# Patient Record
Sex: FEMALE | Race: WHITE | NOT HISPANIC OR LATINO | Employment: OTHER | URBAN - METROPOLITAN AREA
[De-identification: names, ages, dates, MRNs, and addresses within clinical notes are randomized per-mention and may not be internally consistent; named-entity substitution may affect disease eponyms.]

---

## 2017-01-12 ENCOUNTER — OFFICE VISIT (OUTPATIENT)
Dept: INTERNAL MEDICINE | Facility: CLINIC | Age: 67
End: 2017-01-12

## 2017-01-12 VITALS
HEIGHT: 63 IN | SYSTOLIC BLOOD PRESSURE: 122 MMHG | TEMPERATURE: 98.7 F | DIASTOLIC BLOOD PRESSURE: 70 MMHG | HEART RATE: 66 BPM | BODY MASS INDEX: 24.43 KG/M2 | WEIGHT: 137.9 LBS | OXYGEN SATURATION: 98 %

## 2017-01-12 DIAGNOSIS — F51.01 PRIMARY INSOMNIA: Primary | ICD-10-CM

## 2017-01-12 PROCEDURE — 99213 OFFICE O/P EST LOW 20 MIN: CPT | Performed by: FAMILY MEDICINE

## 2017-01-12 RX ORDER — ZOLPIDEM TARTRATE 10 MG/1
TABLET ORAL
Qty: 90 TABLET | Refills: 1 | Status: SHIPPED | OUTPATIENT
Start: 2017-01-12 | End: 2017-01-30 | Stop reason: DRUGHIGH

## 2017-01-12 NOTE — PROGRESS NOTES
Subjective   Eve Khan is a 66 y.o. female.     Chief Complaint   Patient presents with   • following up on insomnia         History of Present Illness   Patient is doing well with Ambien her primary focus is achieving sleep once she is asleep she stays asleep Ambien is working well for her  The following portions of the patient's history were reviewed and updated as appropriate: allergies, current medications, past family history, past medical history, past social history, past surgical history and problem list.    Review of Systems   Constitutional: Negative.    Musculoskeletal: Positive for arthralgias.   Neurological: Negative.    Hematological: Negative.    Psychiatric/Behavioral: Negative.        Objective   Physical Exam   Constitutional: She is oriented to person, place, and time. She appears well-developed and well-nourished.   HENT:   Head: Normocephalic and atraumatic.   Eyes: Conjunctivae are normal.   Neurological: She is alert and oriented to person, place, and time.   Psychiatric: She has a normal mood and affect. Her behavior is normal. Judgment and thought content normal.   Nursing note and vitals reviewed.      Assessment/Plan   Eve was seen today for following up on insomnia.    Diagnoses and all orders for this visit:    Primary insomnia    Other orders  -     zolpidem (AMBIEN) 10 MG tablet; 1/2 to 1 at bedtime for sleep      written prescription given a patient  To send to her insurance company for #[id number]  Follow-up 3 months

## 2017-01-12 NOTE — MR AVS SNAPSHOT
Eve Khan   1/12/2017 12:30 PM   Office Visit    Dept Phone:  [phone]   Encounter #:  [id number]    Provider:  Yifan Douglas MD   Department:  [facility]                Your Full Care Plan              Today's Medication Changes          These changes are accurate as of: 1/12/17 11:59 PM.  If you have any questions, ask your nurse or doctor.               Medication(s)that have changed:     zolpidem 10 MG tablet   Commonly known as:  AMBIEN   1/2 to 1 at bedtime for sleep   What changed:    - how much to take  - how to take this  - when to take this  - reasons to take this  - additional instructions   Changed by:  Yifan Douglas MD            Where to Get Your Medications      You can get these medications from any pharmacy     Bring a paper prescription for each of these medications     zolpidem 10 MG tablet                  Your Updated Medication List          This list is accurate as of: 1/12/17 11:59 PM.  Always use your most recent med list.                cycloSPORINE 0.05 % ophthalmic emulsion   Commonly known as:  RESTASIS       estradiol 0.1 MG/GM vaginal cream   Commonly known as:  ESTRACE   Insert 2 g into the vagina Every Other Day. INSERT 2 gm APPLICATORFUL VAGINALLY every other day       Loratadine 10 MG capsule       spironolactone 50 MG tablet   Commonly known as:  ALDACTONE   Take 1 tablet by mouth daily.       zolpidem 10 MG tablet   Commonly known as:  AMBIEN   1/2 to 1 at bedtime for sleep               You Were Diagnosed With        Codes Comments    Primary insomnia    -  Primary ICD-10-CM: F51.01  ICD-9-CM: 307.42       Instructions     None    Patient Instructions History      Upcoming Appointments       MyChart Signup     [facility] allows you to send messages to your doctor, view your test results, renew your prescriptions, schedule appointments, and more. To sign up, go to  "[email or web address] and click on the Sign Up Now link in the New User? box. Enter your [employer] Activation Code exactly as it appears below along with the last four digits of your Social Security Number and your Date of Birth () to complete the sign-up process. If you do not sign up before the expiration date, you must request a new code.    [employer] Activation Code: [email or web address]  Expires: 2017  1:21 PM    If you have questions, you can email [email or web address] or call [phone] to talk to our [employer] staff. Remember, [employer] is NOT to be used for urgent needs. For medical emergencies, dial 911.               Other Info from Your Visit           Allergies     Amoxicillin  Hives      Reason for Visit     following up on insomnia           Vital Signs     Blood Pressure Pulse Temperature Height Weight    122/70 (BP Location: Left arm, Patient Position: Sitting, Cuff Size: Adult) 66 98.7 °F (37.1 °C) (Oral) 63\" (160 cm) 137 lb 14.4 oz (62.6 kg)    Last Menstrual Period Oxygen Saturation Breastfeeding? Body Mass Index Smoking Status    (Approximate) 98% No 24.43 kg/m2 Never Smoker      Problems and Diagnoses Noted     Difficulty falling or staying asleep        "

## 2017-01-20 ENCOUNTER — TELEPHONE (OUTPATIENT)
Dept: INTERNAL MEDICINE | Facility: CLINIC | Age: 67
End: 2017-01-20

## 2017-01-20 NOTE — TELEPHONE ENCOUNTER
[facility] called stating that the zolpidem is non-formulary and wanted to change this medication.  Spoke to patient - she is to call [employer] and her local pharmacy to see what her price would be before making her decision.  She will call back to let us know what she decides.

## 2017-01-23 ENCOUNTER — TELEPHONE (OUTPATIENT)
Dept: INTERNAL MEDICINE | Facility: CLINIC | Age: 67
End: 2017-01-23

## 2017-01-23 NOTE — TELEPHONE ENCOUNTER
Patient called stating a=that all she needs is a PA for her zolpidem.  Spoke to [facility] [phone] and PA for zolpidem denied - patient has not been on any other medication to help with her insomnia.  The covered medications are Rozerem, Trazodone, and Silenor.  Patient would like to try a covered medication.  Please advise.

## 2017-01-23 NOTE — TELEPHONE ENCOUNTER
[facility] called back asking if Dr. Douglas wanted to prescribe something else instead of the zolpidem.  We still have not hears from patient about her decision.  [facility] was notified that this prescription will stand as is until patient asks for it to be changed.  The zolpidem prescription will be denied at this point - patient will not be able to fill a non covered medication through [facility] and they will notify patient of this.

## 2017-01-24 RX ORDER — TRAZODONE HYDROCHLORIDE 50 MG/1
50 TABLET ORAL NIGHTLY
Qty: 90 TABLET | Refills: 0 | Status: SHIPPED | OUTPATIENT
Start: 2017-01-24 | End: 2017-01-30 | Stop reason: ALTCHOICE

## 2017-01-30 ENCOUNTER — TELEPHONE (OUTPATIENT)
Dept: INTERNAL MEDICINE | Facility: CLINIC | Age: 67
End: 2017-01-30

## 2017-01-30 RX ORDER — ZOLPIDEM TARTRATE 5 MG/1
5 TABLET ORAL NIGHTLY PRN
Qty: 30 TABLET | Refills: 0 | OUTPATIENT
Start: 2017-01-30 | End: 2017-05-30 | Stop reason: SDUPTHER

## 2017-01-30 NOTE — TELEPHONE ENCOUNTER
Patient called stating that she read information about Trazodone and she would rather not take this medication.  She would like to have a prescription for zolpidem called to [facility].  She will pay cash for this since it is not covered under her insurance.  Please advise.

## 2017-05-30 ENCOUNTER — OFFICE VISIT (OUTPATIENT)
Dept: INTERNAL MEDICINE | Facility: CLINIC | Age: 67
End: 2017-05-30

## 2017-05-30 VITALS
BODY MASS INDEX: 23.12 KG/M2 | TEMPERATURE: 98.6 F | WEIGHT: 130.5 LBS | OXYGEN SATURATION: 100 % | HEIGHT: 63 IN | SYSTOLIC BLOOD PRESSURE: 134 MMHG | HEART RATE: 69 BPM | DIASTOLIC BLOOD PRESSURE: 86 MMHG

## 2017-05-30 DIAGNOSIS — F51.01 PRIMARY INSOMNIA: Primary | ICD-10-CM

## 2017-05-30 DIAGNOSIS — M19.90 ARTHRITIS: ICD-10-CM

## 2017-05-30 DIAGNOSIS — Z00.00 MEDICARE ANNUAL WELLNESS VISIT, SUBSEQUENT: ICD-10-CM

## 2017-05-30 DIAGNOSIS — M85.80 OSTEOPENIA: ICD-10-CM

## 2017-05-30 DIAGNOSIS — R93.6 ABNORMAL FINDINGS ON DIAGNOSTIC IMAGING OF LIMBS: ICD-10-CM

## 2017-05-30 PROBLEM — H35.711 CENTRAL SEROUS CHORIORETINOPATHY OF RIGHT EYE: Status: ACTIVE | Noted: 2017-05-30

## 2017-05-30 PROCEDURE — G0009 ADMIN PNEUMOCOCCAL VACCINE: HCPCS | Performed by: FAMILY MEDICINE

## 2017-05-30 PROCEDURE — 90670 PCV13 VACCINE IM: CPT | Performed by: FAMILY MEDICINE

## 2017-05-30 PROCEDURE — G0439 PPPS, SUBSEQ VISIT: HCPCS | Performed by: FAMILY MEDICINE

## 2017-05-30 PROCEDURE — 99214 OFFICE O/P EST MOD 30 MIN: CPT | Performed by: FAMILY MEDICINE

## 2017-05-30 RX ORDER — BUDESONIDE AND FORMOTEROL FUMARATE DIHYDRATE 160; 4.5 UG/1; UG/1
2 AEROSOL RESPIRATORY (INHALATION) 2 TIMES DAILY PRN
COMMUNITY
End: 2021-08-02

## 2017-05-30 RX ORDER — SPIRONOLACTONE 50 MG/1
50 TABLET, FILM COATED ORAL DAILY
Qty: 90 TABLET | Refills: 0 | Status: SHIPPED | OUTPATIENT
Start: 2017-05-30 | End: 2017-09-30 | Stop reason: SDUPTHER

## 2017-05-30 RX ORDER — ZOLPIDEM TARTRATE 5 MG/1
5 TABLET ORAL NIGHTLY PRN
Qty: 30 TABLET | Refills: 1 | Status: SHIPPED | OUTPATIENT
Start: 2017-05-30 | End: 2018-06-29 | Stop reason: SDUPTHER

## 2017-06-02 ENCOUNTER — APPOINTMENT (OUTPATIENT)
Dept: LAB | Facility: HOSPITAL | Age: 67
End: 2017-06-02

## 2017-06-02 LAB
25(OH)D3 SERPL-MCNC: 38.5 NG/ML (ref 30–100)
CHOLEST SERPL-MCNC: 201 MG/DL (ref 0–200)
HCV AB SER DONR QL: NORMAL
HDLC SERPL-MCNC: 107 MG/DL (ref 40–60)
LDLC SERPL CALC-MCNC: 79 MG/DL (ref 0–100)
LDLC/HDLC SERPL: 0.74 {RATIO}
TRIGL SERPL-MCNC: 74 MG/DL (ref 0–150)
VLDLC SERPL-MCNC: 14.8 MG/DL (ref 5–40)

## 2017-06-02 PROCEDURE — 36415 COLL VENOUS BLD VENIPUNCTURE: CPT | Performed by: FAMILY MEDICINE

## 2017-06-02 PROCEDURE — 80061 LIPID PANEL: CPT | Performed by: FAMILY MEDICINE

## 2017-06-02 PROCEDURE — 82306 VITAMIN D 25 HYDROXY: CPT | Performed by: FAMILY MEDICINE

## 2017-06-02 PROCEDURE — 86803 HEPATITIS C AB TEST: CPT | Performed by: FAMILY MEDICINE

## 2017-06-07 NOTE — PROGRESS NOTES
Subjective   Eve Khan is a 66 y.o. female.     Chief Complaint   Patient presents with   • subsequent medicare wellness   • follow up to insomnia   • follow up to edema     Social History   Substance Use Topics   • Smoking status: Never Smoker   • Smokeless tobacco: Never Used   • Alcohol use Yes      Comment: MODERATELY       History of Present Illness   Follow up for insomnia and edema she's been using effectively for insomnia despite continue the same she also uses spironolactone edema which is very well-controlled she has no muscle aches or muscle spasms or cramps she is adequately hydrated and monitor kidney function as well as follow-up of vitamin D level due to her osteopenia and check lipid profile she has no acute concerns and is here for ongoing chronic disease management  The following portions of the patient's history were reviewed and updated as appropriate:PMHroutine: Social history , Past Medical History, Allergies, Current Medications, Active Problem List, Family History and Health Maintenance    Review of Systems   Constitutional: Negative for appetite change, fever and unexpected weight change.   HENT: Negative for ear pain, facial swelling and sore throat.    Eyes: Negative for pain and visual disturbance.   Respiratory: Negative for chest tightness, shortness of breath and wheezing.    Cardiovascular: Negative for chest pain and palpitations.   Gastrointestinal: Negative for abdominal pain and blood in stool.   Endocrine: Negative.    Genitourinary: Negative for difficulty urinating and hematuria.   Musculoskeletal: Negative for joint swelling.   Neurological: Negative for tremors, seizures and syncope.   Hematological: Negative for adenopathy.   Psychiatric/Behavioral: Negative.        Objective   Vitals:    05/30/17 1240   BP: 134/86   BP Location: Right arm   Patient Position: Sitting   Cuff Size: Adult   Pulse: 69   Temp: 98.6 °F (37 °C)   TempSrc: Oral   SpO2: 100%   Weight: 130 lb 8  "oz (59.2 kg)   Height: 63\" (160 cm)     Body mass index is 23.12 kg/(m^2).    Physical Exam   Constitutional: She is oriented to person, place, and time. She appears well-developed and well-nourished. No distress.   HENT:   Head: Normocephalic and atraumatic.   Right Ear: External ear normal.   Left Ear: External ear normal.   Eyes: Conjunctivae and EOM are normal. Pupils are equal, round, and reactive to light. Right eye exhibits no discharge. Left eye exhibits no discharge. No scleral icterus.   Neck: Normal range of motion. Neck supple. No tracheal deviation present. No thyromegaly present.   Cardiovascular: Normal rate, regular rhythm, normal heart sounds, intact distal pulses and normal pulses.  Exam reveals no gallop.    No murmur heard.  Pulmonary/Chest: Effort normal and breath sounds normal. No respiratory distress. She has no wheezes. She has no rales.   Abdominal: Soft. Bowel sounds are normal. She exhibits no distension. There is no tenderness.   Musculoskeletal: Normal range of motion.   Neurological: She is alert and oriented to person, place, and time. She exhibits normal muscle tone. Coordination normal.   Skin: Skin is warm. No rash noted. No erythema. No pallor.   Psychiatric: She has a normal mood and affect. Her behavior is normal. Judgment and thought content normal.   Nursing note and vitals reviewed.      Assessment/Plan   Problem List Items Addressed This Visit        Musculoskeletal and Integument    Arthritis    Osteopenia    Relevant Orders    Vitamin D 25 Hydroxy (Completed)       Other    Primary insomnia - Primary    Medicare annual wellness visit, subsequent    Relevant Orders    Hepatitis C Antibody (Completed)    DEXA Bone Density Axial    Lipid Panel (Completed)      Other Visit Diagnoses     Abnormal findings on diagnostic imaging of limbs         Relevant Orders    DEXA Bone Density Axial         follow-up results of her continue present management of osteopenia insomnia and edema " follow-up results

## 2017-06-12 ENCOUNTER — HOSPITAL ENCOUNTER (OUTPATIENT)
Dept: BONE DENSITY | Facility: HOSPITAL | Age: 67
Discharge: HOME | End: 2017-06-12
Admitting: FAMILY MEDICINE

## 2017-06-12 DIAGNOSIS — Z00.00 MEDICARE ANNUAL WELLNESS VISIT, SUBSEQUENT: ICD-10-CM

## 2017-06-12 DIAGNOSIS — R93.6 ABNORMAL FINDINGS ON DIAGNOSTIC IMAGING OF LIMBS: ICD-10-CM

## 2017-06-12 PROCEDURE — 77080 DXA BONE DENSITY AXIAL: CPT

## 2017-06-15 ENCOUNTER — TELEPHONE (OUTPATIENT)
Dept: INTERNAL MEDICINE | Facility: CLINIC | Age: 67
End: 2017-06-15

## 2017-06-15 NOTE — TELEPHONE ENCOUNTER
----- Message from Yifan Douglas MD sent at 6/15/2017  4:47 PM EDT -----  No change in bone density recommend calcium and vitamin D daily

## 2017-08-23 ENCOUNTER — TRANSCRIBE ORDERS (OUTPATIENT)
Dept: INTERNAL MEDICINE | Facility: CLINIC | Age: 67
End: 2017-08-23

## 2017-08-23 DIAGNOSIS — Z12.31 ENCOUNTER FOR MAMMOGRAM TO ESTABLISH BASELINE MAMMOGRAM: Primary | ICD-10-CM

## 2017-09-14 ENCOUNTER — HOSPITAL ENCOUNTER (OUTPATIENT)
Dept: MAMMOGRAPHY | Facility: HOSPITAL | Age: 67
Discharge: HOME | End: 2017-09-14
Admitting: FAMILY MEDICINE

## 2017-09-14 DIAGNOSIS — Z12.31 ENCOUNTER FOR MAMMOGRAM TO ESTABLISH BASELINE MAMMOGRAM: ICD-10-CM

## 2017-09-14 PROCEDURE — G0202 SCR MAMMO BI INCL CAD: HCPCS

## 2017-09-18 ENCOUNTER — TELEPHONE (OUTPATIENT)
Dept: INTERNAL MEDICINE | Facility: CLINIC | Age: 67
End: 2017-09-18

## 2017-09-18 NOTE — TELEPHONE ENCOUNTER
----- Message from Yifan Douglas MD sent at 9/18/2017  8:42 AM EDT -----  No evidence of malignancy in either breast

## 2017-10-02 RX ORDER — SPIRONOLACTONE 50 MG/1
TABLET, FILM COATED ORAL
Qty: 90 TABLET | Refills: 0 | Status: SHIPPED | OUTPATIENT
Start: 2017-10-02 | End: 2018-06-29 | Stop reason: SDUPTHER

## 2018-06-29 ENCOUNTER — OFFICE VISIT (OUTPATIENT)
Dept: INTERNAL MEDICINE | Facility: CLINIC | Age: 68
End: 2018-06-29

## 2018-06-29 VITALS
TEMPERATURE: 98.6 F | HEART RATE: 71 BPM | OXYGEN SATURATION: 98 % | SYSTOLIC BLOOD PRESSURE: 132 MMHG | WEIGHT: 132.3 LBS | BODY MASS INDEX: 23.44 KG/M2 | HEIGHT: 63 IN | DIASTOLIC BLOOD PRESSURE: 76 MMHG

## 2018-06-29 DIAGNOSIS — F51.01 PRIMARY INSOMNIA: ICD-10-CM

## 2018-06-29 DIAGNOSIS — H81.13 BENIGN PAROXYSMAL POSITIONAL VERTIGO DUE TO BILATERAL VESTIBULAR DISORDER: ICD-10-CM

## 2018-06-29 DIAGNOSIS — R60.0 LOCALIZED EDEMA: ICD-10-CM

## 2018-06-29 DIAGNOSIS — Z23 NEED FOR VACCINATION FOR PNEUMOCOCCUS: ICD-10-CM

## 2018-06-29 DIAGNOSIS — Z00.00 MEDICARE ANNUAL WELLNESS VISIT, SUBSEQUENT: Primary | ICD-10-CM

## 2018-06-29 PROCEDURE — G0009 ADMIN PNEUMOCOCCAL VACCINE: HCPCS | Performed by: FAMILY MEDICINE

## 2018-06-29 PROCEDURE — 99214 OFFICE O/P EST MOD 30 MIN: CPT | Performed by: FAMILY MEDICINE

## 2018-06-29 PROCEDURE — G0439 PPPS, SUBSEQ VISIT: HCPCS | Performed by: FAMILY MEDICINE

## 2018-06-29 PROCEDURE — 90732 PPSV23 VACC 2 YRS+ SUBQ/IM: CPT | Performed by: FAMILY MEDICINE

## 2018-06-29 RX ORDER — SPIRONOLACTONE 50 MG/1
50 TABLET, FILM COATED ORAL DAILY
Qty: 90 TABLET | Refills: 1 | Status: SHIPPED | OUTPATIENT
Start: 2018-06-29 | End: 2018-07-26 | Stop reason: SDUPTHER

## 2018-06-29 RX ORDER — ZOLPIDEM TARTRATE 5 MG/1
5 TABLET ORAL NIGHTLY PRN
Qty: 30 TABLET | Refills: 1 | Status: SHIPPED | OUTPATIENT
Start: 2018-06-29 | End: 2018-07-11 | Stop reason: SDUPTHER

## 2018-06-30 LAB
ALBUMIN SERPL-MCNC: 3.9 G/DL (ref 3.6–4.8)
ALBUMIN/GLOB SERPL: 1.6 {RATIO} (ref 1.2–2.2)
ALP SERPL-CCNC: 39 IU/L (ref 39–117)
ALT SERPL-CCNC: 22 IU/L (ref 0–32)
AST SERPL-CCNC: 23 IU/L (ref 0–40)
BASOPHILS # BLD AUTO: 0.1 X10E3/UL (ref 0–0.2)
BASOPHILS NFR BLD AUTO: 1 %
BILIRUB SERPL-MCNC: 0.4 MG/DL (ref 0–1.2)
BUN SERPL-MCNC: 19 MG/DL (ref 8–27)
BUN/CREAT SERPL: 20 (ref 12–28)
CALCIUM SERPL-MCNC: 9.2 MG/DL (ref 8.7–10.3)
CHLORIDE SERPL-SCNC: 99 MMOL/L (ref 96–106)
CHOLEST SERPL-MCNC: 188 MG/DL (ref 100–199)
CO2 SERPL-SCNC: 26 MMOL/L (ref 20–29)
CREAT SERPL-MCNC: 0.94 MG/DL (ref 0.57–1)
EOSINOPHIL # BLD AUTO: 0.1 X10E3/UL (ref 0–0.4)
EOSINOPHIL NFR BLD AUTO: 4 %
ERYTHROCYTE [DISTWIDTH] IN BLOOD BY AUTOMATED COUNT: 13.4 % (ref 12.3–15.4)
GLOBULIN SER CALC-MCNC: 2.5 G/DL (ref 1.5–4.5)
GLUCOSE SERPL-MCNC: 82 MG/DL (ref 65–99)
HCT VFR BLD AUTO: 39.4 % (ref 34–46.6)
HDLC SERPL-MCNC: 91 MG/DL
HGB BLD-MCNC: 13.3 G/DL (ref 11.1–15.9)
IMM GRANULOCYTES # BLD: 0 X10E3/UL (ref 0–0.1)
IMM GRANULOCYTES NFR BLD: 0 %
LDLC SERPL CALC-MCNC: 75 MG/DL (ref 0–99)
LYMPHOCYTES # BLD AUTO: 1.2 X10E3/UL (ref 0.7–3.1)
LYMPHOCYTES NFR BLD AUTO: 33 %
MCH RBC QN AUTO: 29.1 PG (ref 26.6–33)
MCHC RBC AUTO-ENTMCNC: 33.8 G/DL (ref 31.5–35.7)
MCV RBC AUTO: 86 FL (ref 79–97)
MONOCYTES # BLD AUTO: 0.4 X10E3/UL (ref 0.1–0.9)
MONOCYTES NFR BLD AUTO: 12 %
NEUTROPHILS # BLD AUTO: 1.8 X10E3/UL (ref 1.4–7)
NEUTROPHILS NFR BLD AUTO: 50 %
PLATELET # BLD AUTO: 213 X10E3/UL (ref 150–379)
POTASSIUM SERPL-SCNC: 4.3 MMOL/L (ref 3.5–5.2)
PROT SERPL-MCNC: 6.4 G/DL (ref 6–8.5)
RBC # BLD AUTO: 4.57 X10E6/UL (ref 3.77–5.28)
SODIUM SERPL-SCNC: 138 MMOL/L (ref 134–144)
T4 FREE SERPL-MCNC: 0.99 NG/DL (ref 0.82–1.77)
TRIGL SERPL-MCNC: 109 MG/DL (ref 0–149)
TSH SERPL DL<=0.005 MIU/L-ACNC: 0.96 UIU/ML (ref 0.45–4.5)
VLDLC SERPL CALC-MCNC: 22 MG/DL (ref 5–40)
WBC # BLD AUTO: 3.6 X10E3/UL (ref 3.4–10.8)

## 2018-07-05 ENCOUNTER — TELEPHONE (OUTPATIENT)
Dept: INTERNAL MEDICINE | Facility: CLINIC | Age: 68
End: 2018-07-05

## 2018-07-05 NOTE — TELEPHONE ENCOUNTER
Patient called stating that she misplaced her prescription for zolpidem.  She would like to  another prescription to send to [facility].  Please advise.

## 2018-07-09 ENCOUNTER — TELEPHONE (OUTPATIENT)
Dept: INTERNAL MEDICINE | Facility: CLINIC | Age: 68
End: 2018-07-09

## 2018-07-11 RX ORDER — ZOLPIDEM TARTRATE 5 MG/1
5 TABLET ORAL NIGHTLY PRN
Qty: 90 TABLET | Refills: 1 | Status: SHIPPED | OUTPATIENT
Start: 2018-07-11 | End: 2018-07-30 | Stop reason: SDUPTHER

## 2018-07-11 RX ORDER — ZOLPIDEM TARTRATE 5 MG/1
5 TABLET ORAL NIGHTLY PRN
Qty: 30 TABLET | Refills: 1 | Status: SHIPPED | OUTPATIENT
Start: 2018-07-11 | End: 2018-07-11 | Stop reason: SDUPTHER

## 2018-07-11 NOTE — TELEPHONE ENCOUNTER
Patient would like to have a prescription for #90 to send to [facility] - prescription for #30 destroyed.  Please print/sign prescription.

## 2018-07-23 ENCOUNTER — TELEPHONE (OUTPATIENT)
Dept: INTERNAL MEDICINE | Facility: CLINIC | Age: 68
End: 2018-07-23

## 2018-07-23 NOTE — TELEPHONE ENCOUNTER
[facility] called stating that zolpidem is not a covered medication but that Trazodone, Rozerum, and Silenor would be covered.  Please advise.

## 2018-07-26 RX ORDER — SPIRONOLACTONE 50 MG/1
TABLET, FILM COATED ORAL
Qty: 90 TABLET | Refills: 0 | Status: SHIPPED | OUTPATIENT
Start: 2018-07-26 | End: 2018-08-08 | Stop reason: DRUGHIGH

## 2018-07-26 NOTE — TELEPHONE ENCOUNTER
Received a letter from Karoline Colby, Pharmacist  With [facility] [employer] that works from [facility] asking for a PA for zolpidem so that patient can get this through [facility] (which has been requested).  In the meantime patient would like to have a prescription sent to [facility] for zolpidem so she can get this with a GoodRx coupon.  Please advise if refill for zolpidem can be called to [facility] for #30.

## 2018-07-31 RX ORDER — ZOLPIDEM TARTRATE 5 MG/1
5 TABLET ORAL NIGHTLY PRN
Qty: 30 TABLET | Refills: 0 | OUTPATIENT
Start: 2018-07-31 | End: 2019-07-10 | Stop reason: SDUPTHER

## 2018-08-07 ENCOUNTER — TELEPHONE (OUTPATIENT)
Dept: INTERNAL MEDICINE | Facility: CLINIC | Age: 68
End: 2018-08-07

## 2018-08-08 RX ORDER — SPIRONOLACTONE 25 MG/1
25 TABLET ORAL DAILY
Qty: 90 TABLET | Refills: 1 | Status: SHIPPED | OUTPATIENT
Start: 2018-08-08 | End: 2019-07-10 | Stop reason: SDUPTHER

## 2018-08-10 ENCOUNTER — TRANSCRIBE ORDERS (OUTPATIENT)
Dept: ADMINISTRATIVE | Facility: HOSPITAL | Age: 68
End: 2018-08-10

## 2018-08-10 DIAGNOSIS — Z12.31 VISIT FOR SCREENING MAMMOGRAM: Primary | ICD-10-CM

## 2018-08-15 ENCOUNTER — TELEPHONE (OUTPATIENT)
Dept: INTERNAL MEDICINE | Facility: CLINIC | Age: 68
End: 2018-08-15

## 2018-09-17 ENCOUNTER — HOSPITAL ENCOUNTER (OUTPATIENT)
Dept: MAMMOGRAPHY | Facility: HOSPITAL | Age: 68
Discharge: HOME | End: 2018-09-17
Admitting: FAMILY MEDICINE

## 2018-09-17 DIAGNOSIS — Z12.31 VISIT FOR SCREENING MAMMOGRAM: ICD-10-CM

## 2018-09-17 PROCEDURE — 77063 BREAST TOMOSYNTHESIS BI: CPT

## 2018-09-17 PROCEDURE — 77067 SCR MAMMO BI INCL CAD: CPT

## 2019-05-30 ENCOUNTER — TELEPHONE (OUTPATIENT)
Dept: INTERNAL MEDICINE | Facility: CLINIC | Age: 69
End: 2019-05-30

## 2019-05-30 NOTE — TELEPHONE ENCOUNTER
Patient called stating that she has had a cold since last Friday with congestion in her chest and ears.  She is also fatigued.  Please advise.

## 2019-05-31 ENCOUNTER — OFFICE VISIT (OUTPATIENT)
Dept: INTERNAL MEDICINE | Facility: CLINIC | Age: 69
End: 2019-05-31

## 2019-05-31 VITALS
HEART RATE: 72 BPM | SYSTOLIC BLOOD PRESSURE: 134 MMHG | TEMPERATURE: 98.6 F | DIASTOLIC BLOOD PRESSURE: 90 MMHG | OXYGEN SATURATION: 98 % | BODY MASS INDEX: 22.8 KG/M2 | WEIGHT: 128.7 LBS

## 2019-05-31 DIAGNOSIS — J06.9 ACUTE URI: ICD-10-CM

## 2019-05-31 DIAGNOSIS — J40 BRONCHITIS: Primary | ICD-10-CM

## 2019-05-31 PROCEDURE — 99213 OFFICE O/P EST LOW 20 MIN: CPT | Performed by: FAMILY MEDICINE

## 2019-05-31 NOTE — TELEPHONE ENCOUNTER
Patient notified.  She stated that she went to Urgent Care last night but that she still has several questions.  Appointment scheduled for patient to see Dr. Douglas today.

## 2019-06-30 NOTE — PROGRESS NOTES
Eve Khan is a 68 y.o. female.      Assessment/Plan   Problem List Items Addressed This Visit        Respiratory    Bronchitis - Primary    Acute URI           Continue present meds   Return if symptoms worsen or fail to improve.      Chief Complaint   Patient presents with   • [facility] follow up to bronchitis     Social History     Tobacco Use   • Smoking status: Never Smoker   • Smokeless tobacco: Never Used   Substance Use Topics   • Alcohol use: Yes     Comment: MODERATELY   • Drug use: No       History of Present Illness   Acute visit for follow up from urgent care treated for bronchitis mayratasha mann rios to be seen here, no fever sweats or chills, some head congestion ond minimally productive cough   The following portions of the patient's history were reviewed and updated as appropriate:PMHroutine: Social history , Allergies, Current Medications, Active Problem List and Health Maintenance    Review of Systems   Constitutional: Positive for fatigue. Negative for activity change and unexpected weight change.   HENT: Positive for congestion, postnasal drip and sore throat. Negative for ear pain.    Eyes: Negative for pain and discharge.   Respiratory: Positive for cough. Negative for chest tightness, shortness of breath and wheezing.    Cardiovascular: Negative for chest pain and palpitations.   Gastrointestinal: Negative for abdominal pain, diarrhea and vomiting.   Endocrine: Negative.    Genitourinary: Negative.    Musculoskeletal: Negative for joint swelling.   Skin: Negative for color change, rash and wound.   Allergic/Immunologic: Negative.    Neurological: Negative for seizures and syncope.   Psychiatric/Behavioral: Negative.        Objective   Vitals:    05/31/19 1450   BP: 134/90   BP Location: Left arm   Patient Position: Sitting   Cuff Size: Adult   Pulse: 72   Temp: 98.6 °F (37 °C)   TempSrc: Oral   SpO2: 98%   Weight: 58.4 kg (128 lb 11.2 oz)     Body mass index  is 22.8 kg/m².  Physical Exam   Constitutional: She is oriented to person, place, and time. She appears well-developed and well-nourished.  Non-toxic appearance. No distress.   HENT:   Head: Normocephalic and atraumatic. Hair is normal.   Right Ear: External ear normal. No drainage, swelling or tenderness. Tympanic membrane is retracted.   Left Ear: External ear normal. No drainage, swelling or tenderness. Tympanic membrane is retracted.   Nose: Mucosal edema present. No epistaxis.   Mouth/Throat: Uvula is midline and mucous membranes are normal. No oral lesions. No uvula swelling. Posterior oropharyngeal erythema present. No oropharyngeal exudate.   Eyes: Conjunctivae and EOM are normal. Pupils are equal, round, and reactive to light. Right eye exhibits no discharge. Left eye exhibits no discharge. No scleral icterus.   Neck: Normal range of motion. Neck supple.   Cardiovascular: Normal rate, regular rhythm and normal heart sounds. Exam reveals no gallop.   No murmur heard.  Pulmonary/Chest: Breath sounds normal. No stridor. No respiratory distress. She has no wheezes. She has no rales. She exhibits no tenderness.   Abdominal: Soft. There is no tenderness.   Lymphadenopathy:     She has cervical adenopathy.   Neurological: She is alert and oriented to person, place, and time. She exhibits normal muscle tone.   Skin: Skin is warm and dry. No rash noted. She is not diaphoretic.   Psychiatric: She has a normal mood and affect. Her behavior is normal. Judgment and thought content normal.   Nursing note and vitals reviewed.    Reviewed Data:  No visits with results within 1 Month(s) from this visit.   Latest known visit with results is:   Office Visit on 06/29/2018   Component Date Value Ref Range Status   • Glucose 06/29/2018 82  65 - 99 mg/dL Final   • BUN 06/29/2018 19  8 - 27 mg/dL Final   • Creatinine 06/29/2018 0.94  0.57 - 1.00 mg/dL Final   • eGFR Non  Am 06/29/2018 63  >59 mL/min/1.73 Final   • eGFR   Am 06/29/2018 72  >59 mL/min/1.73 Final   • BUN/Creatinine Ratio 06/29/2018 20  12 - 28 Final   • Sodium 06/29/2018 138  134 - 144 mmol/L Final   • Potassium 06/29/2018 4.3  3.5 - 5.2 mmol/L Final   • Chloride 06/29/2018 99  96 - 106 mmol/L Final   • Total CO2 06/29/2018 26  20 - 29 mmol/L Final                  **Please note reference interval change**   • Calcium 06/29/2018 9.2  8.7 - 10.3 mg/dL Final   • Total Protein 06/29/2018 6.4  6.0 - 8.5 g/dL Final   • Albumin 06/29/2018 3.9  3.6 - 4.8 g/dL Final   • Globulin 06/29/2018 2.5  1.5 - 4.5 g/dL Final   • A/G Ratio 06/29/2018 1.6  1.2 - 2.2 Final   • Total Bilirubin 06/29/2018 0.4  0.0 - 1.2 mg/dL Final   • Alkaline Phosphatase 06/29/2018 39  39 - 117 IU/L Final   • AST (SGOT) 06/29/2018 23  0 - 40 IU/L Final   • ALT (SGPT) 06/29/2018 22  0 - 32 IU/L Final   • Total Cholesterol 06/29/2018 188  100 - 199 mg/dL Final   • Triglycerides 06/29/2018 109  0 - 149 mg/dL Final   • HDL Cholesterol 06/29/2018 91  >39 mg/dL Final   • VLDL Cholesterol 06/29/2018 22  5 - 40 mg/dL Final   • LDL Cholesterol  06/29/2018 75  0 - 99 mg/dL Final   • TSH 06/29/2018 0.956  0.450 - 4.500 uIU/mL Final   • Free T4 06/29/2018 0.99  0.82 - 1.77 ng/dL Final   • WBC 06/29/2018 3.6  3.4 - 10.8 x10E3/uL Final   • RBC 06/29/2018 4.57  3.77 - 5.28 x10E6/uL Final   • Hemoglobin 06/29/2018 13.3  11.1 - 15.9 g/dL Final   • Hematocrit 06/29/2018 39.4  34.0 - 46.6 % Final   • MCV 06/29/2018 86  79 - 97 fL Final   • MCH 06/29/2018 29.1  26.6 - 33.0 pg Final   • MCHC 06/29/2018 33.8  31.5 - 35.7 g/dL Final   • RDW 06/29/2018 13.4  12.3 - 15.4 % Final   • Platelets 06/29/2018 213  150 - 379 x10E3/uL Final   • Neutrophil Rel % 06/29/2018 50  Not Estab. % Final   • Lymphocyte Rel % 06/29/2018 33  Not Estab. % Final   • Monocyte Rel % 06/29/2018 12  Not Estab. % Final   • Eosinophil Rel % 06/29/2018 4  Not Estab. % Final   • Basophil Rel % 06/29/2018 1  Not Estab. % Final   • Neutrophils Absolute  06/29/2018 1.8  1.4 - 7.0 x10E3/uL Final   • Lymphocytes Absolute 06/29/2018 1.2  0.7 - 3.1 x10E3/uL Final   • Monocytes Absolute 06/29/2018 0.4  0.1 - 0.9 x10E3/uL Final   • Eosinophils Absolute 06/29/2018 0.1  0.0 - 0.4 x10E3/uL Final   • Basophils Absolute 06/29/2018 0.1  0.0 - 0.2 x10E3/uL Final   • Immature Granulocyte Rel % 06/29/2018 0  Not Estab. % Final   • Immature Grans Absolute 06/29/2018 0.0  0.0 - 0.1 x10E3/uL Final

## 2019-07-10 ENCOUNTER — OFFICE VISIT (OUTPATIENT)
Dept: INTERNAL MEDICINE | Facility: CLINIC | Age: 69
End: 2019-07-10

## 2019-07-10 VITALS
TEMPERATURE: 97.9 F | OXYGEN SATURATION: 99 % | DIASTOLIC BLOOD PRESSURE: 70 MMHG | SYSTOLIC BLOOD PRESSURE: 120 MMHG | HEART RATE: 67 BPM | BODY MASS INDEX: 22.5 KG/M2 | WEIGHT: 127 LBS

## 2019-07-10 DIAGNOSIS — Z12.31 ENCOUNTER FOR SCREENING MAMMOGRAM FOR MALIGNANT NEOPLASM OF BREAST: ICD-10-CM

## 2019-07-10 DIAGNOSIS — N95.2 ATROPHIC VAGINITIS: ICD-10-CM

## 2019-07-10 DIAGNOSIS — Z00.00 MEDICARE ANNUAL WELLNESS VISIT, SUBSEQUENT: Primary | ICD-10-CM

## 2019-07-10 DIAGNOSIS — F51.01 PRIMARY INSOMNIA: ICD-10-CM

## 2019-07-10 DIAGNOSIS — N95.1 MENOPAUSAL VAGINAL DRYNESS: ICD-10-CM

## 2019-07-10 DIAGNOSIS — R60.0 LOCALIZED EDEMA: ICD-10-CM

## 2019-07-10 DIAGNOSIS — N95.1 MENOPAUSAL STATE: ICD-10-CM

## 2019-07-10 PROCEDURE — G0439 PPPS, SUBSEQ VISIT: HCPCS | Performed by: FAMILY MEDICINE

## 2019-07-10 PROCEDURE — 99214 OFFICE O/P EST MOD 30 MIN: CPT | Performed by: FAMILY MEDICINE

## 2019-07-10 RX ORDER — ESTRADIOL 0.1 MG/G
2 CREAM VAGINAL EVERY OTHER DAY
Qty: 3 EACH | Refills: 4 | Status: SHIPPED | OUTPATIENT
Start: 2019-07-10 | End: 2020-07-31 | Stop reason: SDUPTHER

## 2019-07-10 RX ORDER — SPIRONOLACTONE 25 MG/1
25 TABLET ORAL DAILY
Qty: 90 TABLET | Refills: 1 | Status: SHIPPED | OUTPATIENT
Start: 2019-07-10 | End: 2020-09-09

## 2019-07-10 RX ORDER — MULTIVITAMIN WITH IRON
1 TABLET ORAL DAILY
COMMUNITY

## 2019-07-10 RX ORDER — ZOLPIDEM TARTRATE 5 MG/1
5 TABLET ORAL NIGHTLY PRN
Qty: 90 TABLET | Refills: 0 | Status: SHIPPED | OUTPATIENT
Start: 2019-07-10 | End: 2020-07-31 | Stop reason: SDUPTHER

## 2019-07-10 NOTE — PROGRESS NOTES
Eve Khan is a 68 y.o. female.      Assessment/Plan   Problem List Items Addressed This Visit        Genitourinary    Menopausal vaginal dryness       Other    Primary insomnia    Medicare annual wellness visit, subsequent - Primary    Relevant Orders    Mammo Screening Bilateral With CAD    DEXA Bone Density Axial    Localized edema    Relevant Orders    Basic Metabolic Panel    Menopausal state    Relevant Orders    Mammo Screening Bilateral With CAD    DEXA Bone Density Axial      Other Visit Diagnoses     Atrophic vaginitis        Relevant Medications    estradiol (ESTRACE) 0.1 MG/GM vaginal cream    Encounter for screening mammogram for malignant neoplasm of breast         Relevant Orders    Mammo Screening Bilateral With CAD         Refilled estradiol written prescription for Ambien follow-up results of BMP for ongoing diuretic therapy use intermittently in the summer as well as follow-up with screening testing otherwise as needed or    Return in about 6 months (around 1/10/2020), or if symptoms worsen or fail to improve, for Recheck, Next scheduled follow up.      Chief Complaint   Patient presents with   • Medicare Wellness-subsequent   • would like to discuss Dr. Douglas prescribing estrace cream     Social History     Tobacco Use   • Smoking status: Never Smoker   • Smokeless tobacco: Never Used   Substance Use Topics   • Alcohol use: Yes     Comment: MODERATELY   • Drug use: No       History of Present Illness   Patient follows appointment for tonic problems menopausal vaginal dryness localized lower extremity bilateral edema in the summertime insomnia she is fairly stable with present treatment plan using Estrace cream 2 or 3 times a week vaginally slight continue same treatment she is aware of this potential side effects of unopposed estrogen and would like to continue same she is using the spironolactone in the summertime to help with her bilateral lower extremity edema and does not have recent BMP  she like to go back on the zolpidem to be used for when she really needs a good night sleep and this will be sent her with a written prescription so she can send her mail order she has no other acute concerns  The following portions of the patient's history were reviewed and updated as appropriate:PMHroutine: Social history , Allergies, Current Medications, Active Problem List and Health Maintenance    Review of Systems   Constitutional: Negative for activity change, appetite change and unexpected weight change.   HENT: Negative for nosebleeds and trouble swallowing.    Eyes: Negative for pain and visual disturbance.   Respiratory: Negative for chest tightness, shortness of breath and wheezing.    Cardiovascular: Negative for chest pain and palpitations.   Gastrointestinal: Negative for abdominal pain and blood in stool.   Endocrine: Negative.    Genitourinary: Negative for difficulty urinating, dyspareunia, dysuria and hematuria.   Musculoskeletal: Negative for joint swelling.   Skin: Negative for color change and rash.   Allergic/Immunologic: Negative.    Neurological: Negative for syncope and speech difficulty.   Hematological: Negative for adenopathy.   Psychiatric/Behavioral: Positive for sleep disturbance. Negative for agitation and confusion.   All other systems reviewed and are negative.      Objective   Vitals:    07/10/19 1118   BP: 120/70   BP Location: Left arm   Patient Position: Sitting   Cuff Size: Adult   Pulse: 67   Temp: 97.9 °F (36.6 °C)   TempSrc: Oral   SpO2: 99%   Weight: 57.6 kg (127 lb)     Body mass index is 22.5 kg/m².  Physical Exam   Constitutional: She is oriented to person, place, and time. She appears well-developed and well-nourished. No distress.   HENT:   Head: Normocephalic and atraumatic.   Eyes: Conjunctivae and EOM are normal. Pupils are equal, round, and reactive to light. Right eye exhibits no discharge. Left eye exhibits no discharge. No scleral icterus.   Neck: Normal range  of motion. Neck supple. No tracheal deviation present. No thyromegaly present.   Cardiovascular: Normal rate, regular rhythm, normal heart sounds, intact distal pulses and normal pulses. Exam reveals no gallop.   No murmur heard.  Pulmonary/Chest: Effort normal and breath sounds normal. No respiratory distress. She has no wheezes. She has no rales.   Musculoskeletal: Normal range of motion. She exhibits no edema.   Neurological: She is alert and oriented to person, place, and time. She exhibits normal muscle tone. Coordination normal.   Skin: Skin is warm. No rash noted. No erythema. No pallor.   Psychiatric: She has a normal mood and affect. Her behavior is normal. Judgment and thought content normal.   Nursing note and vitals reviewed.    Reviewed Data:  No visits with results within 1 Month(s) from this visit.   Latest known visit with results is:   Office Visit on 06/29/2018   Component Date Value Ref Range Status   • Glucose 06/29/2018 82  65 - 99 mg/dL Final   • BUN 06/29/2018 19  8 - 27 mg/dL Final   • Creatinine 06/29/2018 0.94  0.57 - 1.00 mg/dL Final   • eGFR Non  Am 06/29/2018 63  >59 mL/min/1.73 Final   • eGFR African Am 06/29/2018 72  >59 mL/min/1.73 Final   • BUN/Creatinine Ratio 06/29/2018 20  12 - 28 Final   • Sodium 06/29/2018 138  134 - 144 mmol/L Final   • Potassium 06/29/2018 4.3  3.5 - 5.2 mmol/L Final   • Chloride 06/29/2018 99  96 - 106 mmol/L Final   • Total CO2 06/29/2018 26  20 - 29 mmol/L Final                  **Please note reference interval change**   • Calcium 06/29/2018 9.2  8.7 - 10.3 mg/dL Final   • Total Protein 06/29/2018 6.4  6.0 - 8.5 g/dL Final   • Albumin 06/29/2018 3.9  3.6 - 4.8 g/dL Final   • Globulin 06/29/2018 2.5  1.5 - 4.5 g/dL Final   • A/G Ratio 06/29/2018 1.6  1.2 - 2.2 Final   • Total Bilirubin 06/29/2018 0.4  0.0 - 1.2 mg/dL Final   • Alkaline Phosphatase 06/29/2018 39  39 - 117 IU/L Final   • AST (SGOT) 06/29/2018 23  0 - 40 IU/L Final   • ALT (SGPT)  06/29/2018 22  0 - 32 IU/L Final   • Total Cholesterol 06/29/2018 188  100 - 199 mg/dL Final   • Triglycerides 06/29/2018 109  0 - 149 mg/dL Final   • HDL Cholesterol 06/29/2018 91  >39 mg/dL Final   • VLDL Cholesterol 06/29/2018 22  5 - 40 mg/dL Final   • LDL Cholesterol  06/29/2018 75  0 - 99 mg/dL Final   • TSH 06/29/2018 0.956  0.450 - 4.500 uIU/mL Final   • Free T4 06/29/2018 0.99  0.82 - 1.77 ng/dL Final   • WBC 06/29/2018 3.6  3.4 - 10.8 x10E3/uL Final   • RBC 06/29/2018 4.57  3.77 - 5.28 x10E6/uL Final   • Hemoglobin 06/29/2018 13.3  11.1 - 15.9 g/dL Final   • Hematocrit 06/29/2018 39.4  34.0 - 46.6 % Final   • MCV 06/29/2018 86  79 - 97 fL Final   • MCH 06/29/2018 29.1  26.6 - 33.0 pg Final   • MCHC 06/29/2018 33.8  31.5 - 35.7 g/dL Final   • RDW 06/29/2018 13.4  12.3 - 15.4 % Final   • Platelets 06/29/2018 213  150 - 379 x10E3/uL Final   • Neutrophil Rel % 06/29/2018 50  Not Estab. % Final   • Lymphocyte Rel % 06/29/2018 33  Not Estab. % Final   • Monocyte Rel % 06/29/2018 12  Not Estab. % Final   • Eosinophil Rel % 06/29/2018 4  Not Estab. % Final   • Basophil Rel % 06/29/2018 1  Not Estab. % Final   • Neutrophils Absolute 06/29/2018 1.8  1.4 - 7.0 x10E3/uL Final   • Lymphocytes Absolute 06/29/2018 1.2  0.7 - 3.1 x10E3/uL Final   • Monocytes Absolute 06/29/2018 0.4  0.1 - 0.9 x10E3/uL Final   • Eosinophils Absolute 06/29/2018 0.1  0.0 - 0.4 x10E3/uL Final   • Basophils Absolute 06/29/2018 0.1  0.0 - 0.2 x10E3/uL Final   • Immature Granulocyte Rel % 06/29/2018 0  Not Estab. % Final   • Immature Grans Absolute 06/29/2018 0.0  0.0 - 0.1 x10E3/uL Final

## 2019-07-10 NOTE — PROGRESS NOTES
QUICK REFERENCE INFORMATION:  The ABCs of the Annual Wellness Visit    Subsequent Medicare Wellness Visit     HEALTH RISK ASSESSMENT    : 1950    Recent Hospitalizations:  No hospitalization(s) within the last year..  ccc      Current Medical Providers:  Patient Care Team:  Yifan Douglas MD as PCP - General        Smoking Status:  Social History     Tobacco Use   Smoking Status Never Smoker   Smokeless Tobacco Never Used       Alcohol Consumption:  Social History     Substance and Sexual Activity   Alcohol Use Yes    Comment: MODERATELY       Depression Screen:   PHQ-2/PHQ-9 Depression Screening 7/10/2019   Little interest or pleasure in doing things 0   Feeling down, depressed, or hopeless 0   Total Score 0       Health Habits and Functional and Cognitive Screening:  Functional & Cognitive Status 7/10/2019   Do you have difficulty preparing food and eating? No   Do you have difficulty bathing yourself, getting dressed or grooming yourself? No   Do you have difficulty using the toilet? No   Do you have difficulty moving around from place to place? No   Do you have trouble with steps or getting out of a bed or a chair? No   In the past year have you fallen or experienced a near fall? No   Current Diet Well Balanced Diet   Dental Exam Up to date   Eye Exam Up to date   Exercise (times per week) 5 times per week   Current Exercise Activities Include Walking   Do you need help using the phone?  No   Are you deaf or do you have serious difficulty hearing?  No   Do you need help with transportation? No   Do you need help shopping? No   Do you need help preparing meals?  No   Do you need help with housework?  No   Do you need help with laundry? No   Do you need help taking your medications? No   Do you need help managing money? No   Do you ever drive or ride in a car without wearing a seat belt? No   Have you felt unusual stress, anger or loneliness in the last month? No   Who do you live with? Spouse   If you  need help, do you have trouble finding someone available to you? No   Have you been bothered in the last four weeks by sexual problems? No   Do you have difficulty concentrating, remembering or making decisions? No           Does the patient have evidence of cognitive impairment? No    Asiprin use counseling: Does not need ASA (and currently is not on it)      Recent Lab Results:    Lab Results   Component Value Date    GLU 82 06/29/2018        Lab Results   Component Value Date    CHOL 201 (H) 06/02/2017    TRIG 109 06/29/2018    HDL 91 06/29/2018    VLDL 22 06/29/2018    LDLHDL 0.74 06/02/2017           Age-appropriate Screening Schedule:  Refer to the list below for future screening recommendations based on patient's age, sex and/or medical conditions. Orders for these recommended tests are listed in the plan section. The patient has been provided with a written plan.    Health Maintenance   Topic Date Due   • DXA SCAN  06/12/2019   • INFLUENZA VACCINE  08/01/2019   • MAMMOGRAM  09/17/2020   • TDAP/TD VACCINES (2 - Td) 06/15/2025   • COLONOSCOPY  09/29/2026   • PNEUMOCOCCAL VACCINES (65+ LOW/MEDIUM RISK)  Completed   • ZOSTER VACCINE  Discontinued        Subjective   History of Present Illness    Eve Khan is a 68 y.o. female who presents for an Annual Wellness Visit.    The following portions of the patient's history were reviewed and updated as appropriate: allergies, current medications, past family history, past medical history, past social history, past surgical history and problem list.    Outpatient Medications Prior to Visit   Medication Sig Dispense Refill   • ALLERGY SERUM INJECTION Inject  under the skin into the appropriate area as directed 1 (One) Time. Once monthly     • budesonide-formoterol (SYMBICORT) 160-4.5 MCG/ACT inhaler Inhale 2 puffs 2 (Two) Times a Day As Needed.     • Loratadine 10 MG capsule Take 1 capsule by mouth daily as needed.     • Magnesium 250 MG tablet Take 1 tablet by  mouth Daily.     • estradiol (ESTRACE) 0.1 MG/GM vaginal cream Insert 2 g into the vagina Every Other Day. INSERT 2 gm APPLICATORFUL VAGINALLY every other day 3 each 4   • spironolactone (ALDACTONE) 25 MG tablet Take 1 tablet by mouth Daily. 90 tablet 1   • zolpidem (AMBIEN) 5 MG tablet Take 1 tablet by mouth At Night As Needed for Sleep. 30 tablet 0   • doxycycline (MONODOX) 100 MG capsule Take 1 capsule by mouth 2 (Two) Times a Day. 14 capsule 0     No facility-administered medications prior to visit.        Patient Active Problem List   Diagnosis   • Acne   • Arthritis   • Primary insomnia   • Osteopenia   • Fungal infection of toenail   • Arthropathy of temporomandibular joint   • Adenomatous polyp of colon   • Abdominal bruit   • Central serous chorioretinopathy of right eye   • Medicare annual wellness visit, subsequent   • Benign paroxysmal positional vertigo due to bilateral vestibular disorder   • Localized edema   • Bronchitis   • Acute URI   • Menopausal vaginal dryness   • Menopausal state       Advance Care Planning:  Patient has an advance directive - a copy has not been provided. Have asked the patient to send this to us to add to record  Sergey Garcia -  Corina Joe -daughter  Identification of Risk Factors:  Risk factors include: none.    Review of Systems    Compared to one year ago, the patient feels her physical health is the same.  Compared to one year ago, the patient feels her mental health is the same.    Objective     Physical Exam    Vitals:    07/10/19 1118   BP: 120/70   BP Location: Left arm   Patient Position: Sitting   Cuff Size: Adult   Pulse: 67   Temp: 97.9 °F (36.6 °C)   TempSrc: Oral   SpO2: 99%   Weight: 57.6 kg (127 lb)   PainSc: 0-No pain       Patient's Body mass index is 22.5 kg/m². BMI is below normal parameters. Recommendations include: none (medical contraindication).      Assessment/Plan   Patient Self-Management and Personalized Health Advice  The patient  has been provided with information about: exercise and preventive services including:   · Advance directive.    Visit Diagnoses:    ICD-10-CM ICD-9-CM   1. Medicare annual wellness visit, subsequent Z00.00 V70.0   2. Primary insomnia F51.01 307.42   3. Menopausal state N95.1 627.2   4. Menopausal vaginal dryness N95.1 627.2   5. Localized edema R60.0 782.3   6. Atrophic vaginitis N95.2 627.3   7. Encounter for screening mammogram for malignant neoplasm of breast  Z12.31 V76.12       Orders Placed This Encounter   Procedures   • Mammo Screening Bilateral With CAD     Standing Status:   Future     Standing Expiration Date:   7/10/2020     Order Specific Question:   Reason for Exam:     Answer:   screen   • DEXA Bone Density Axial     Standing Status:   Future     Standing Expiration Date:   7/10/2020     Order Specific Question:   Reason for Exam:     Answer:   screen   • Basic Metabolic Panel       Outpatient Encounter Medications as of 7/10/2019   Medication Sig Dispense Refill   • ALLERGY SERUM INJECTION Inject  under the skin into the appropriate area as directed 1 (One) Time. Once monthly     • budesonide-formoterol (SYMBICORT) 160-4.5 MCG/ACT inhaler Inhale 2 puffs 2 (Two) Times a Day As Needed.     • estradiol (ESTRACE) 0.1 MG/GM vaginal cream Insert 2 g into the vagina Every Other Day. INSERT 2 gm APPLICATORFUL VAGINALLY every other day 3 each 4   • Loratadine 10 MG capsule Take 1 capsule by mouth daily as needed.     • Magnesium 250 MG tablet Take 1 tablet by mouth Daily.     • spironolactone (ALDACTONE) 25 MG tablet Take 1 tablet by mouth Daily. 90 tablet 1   • zolpidem (AMBIEN) 5 MG tablet Take 1 tablet by mouth At Night As Needed for Sleep. 90 tablet 0   • [DISCONTINUED] estradiol (ESTRACE) 0.1 MG/GM vaginal cream Insert 2 g into the vagina Every Other Day. INSERT 2 gm APPLICATORFUL VAGINALLY every other day 3 each 4   • [DISCONTINUED] spironolactone (ALDACTONE) 25 MG tablet Take 1 tablet by mouth Daily.  90 tablet 1   • [DISCONTINUED] zolpidem (AMBIEN) 5 MG tablet Take 1 tablet by mouth At Night As Needed for Sleep. 30 tablet 0   • doxycycline (MONODOX) 100 MG capsule Take 1 capsule by mouth 2 (Two) Times a Day. 14 capsule 0     No facility-administered encounter medications on file as of 7/10/2019.        Reviewed use of high risk medication in the elderly: yes  Reviewed for potential of harmful drug interactions in the elderly: yes    Follow Up:  Return in about 6 months (around 1/10/2020), or if symptoms worsen or fail to improve, for Recheck, Next scheduled follow up.     An After Visit Summary and PPPS with all of these plans were given to the patient.

## 2019-07-11 LAB
BUN SERPL-MCNC: 20 MG/DL (ref 8–23)
BUN/CREAT SERPL: 21.1 (ref 7–25)
CALCIUM SERPL-MCNC: 10.1 MG/DL (ref 8.6–10.5)
CHLORIDE SERPL-SCNC: 98 MMOL/L (ref 98–107)
CO2 SERPL-SCNC: 31.2 MMOL/L (ref 22–29)
CREAT SERPL-MCNC: 0.95 MG/DL (ref 0.57–1)
GLUCOSE SERPL-MCNC: 90 MG/DL (ref 65–99)
POTASSIUM SERPL-SCNC: 5 MMOL/L (ref 3.5–5.2)
SODIUM SERPL-SCNC: 139 MMOL/L (ref 136–145)

## 2019-09-20 ENCOUNTER — HOSPITAL ENCOUNTER (OUTPATIENT)
Dept: BONE DENSITY | Facility: HOSPITAL | Age: 69
Discharge: HOME | End: 2019-09-20

## 2019-09-20 ENCOUNTER — HOSPITAL ENCOUNTER (OUTPATIENT)
Dept: MAMMOGRAPHY | Facility: HOSPITAL | Age: 69
Discharge: HOME | End: 2019-09-20
Admitting: FAMILY MEDICINE

## 2019-09-20 DIAGNOSIS — Z12.31 ENCOUNTER FOR SCREENING MAMMOGRAM FOR MALIGNANT NEOPLASM OF BREAST: ICD-10-CM

## 2019-09-20 DIAGNOSIS — N95.1 MENOPAUSAL STATE: ICD-10-CM

## 2019-09-20 DIAGNOSIS — Z00.00 MEDICARE ANNUAL WELLNESS VISIT, SUBSEQUENT: ICD-10-CM

## 2019-09-20 PROCEDURE — 77067 SCR MAMMO BI INCL CAD: CPT

## 2019-09-20 PROCEDURE — 77080 DXA BONE DENSITY AXIAL: CPT

## 2020-01-02 ENCOUNTER — TELEPHONE (OUTPATIENT)
Dept: INTERNAL MEDICINE | Facility: CLINIC | Age: 70
End: 2020-01-02

## 2020-01-02 NOTE — TELEPHONE ENCOUNTER
Patient called stating that she has had a stye but she was not sure who to see.  LMA - patient was notified that Dr. Douglas would see her tomorrow.  She was asked to call the office to schedule an appointment.

## 2020-01-03 ENCOUNTER — OFFICE VISIT (OUTPATIENT)
Dept: INTERNAL MEDICINE | Facility: CLINIC | Age: 70
End: 2020-01-03

## 2020-01-03 VITALS
TEMPERATURE: 98.1 F | BODY MASS INDEX: 23.39 KG/M2 | HEART RATE: 75 BPM | DIASTOLIC BLOOD PRESSURE: 80 MMHG | SYSTOLIC BLOOD PRESSURE: 124 MMHG | HEIGHT: 63 IN | OXYGEN SATURATION: 98 % | WEIGHT: 132 LBS

## 2020-01-03 DIAGNOSIS — H00.014 HORDEOLUM EXTERNUM OF LEFT UPPER EYELID: Primary | ICD-10-CM

## 2020-01-03 PROBLEM — R60.0 LOCALIZED EDEMA: Status: RESOLVED | Noted: 2018-06-29 | Resolved: 2020-01-03

## 2020-01-03 PROCEDURE — 99213 OFFICE O/P EST LOW 20 MIN: CPT | Performed by: FAMILY MEDICINE

## 2020-01-03 NOTE — PROGRESS NOTES
"Eve Khan is a 69 y.o. female.      Assessment/Plan   Problem List Items Addressed This Visit        Other    Hordeolum externum of left upper eyelid - Primary         Patient will see Dr. Lino as she leaves this office    Return if symptoms worsen or fail to improve, for Recheck, Next scheduled follow up.      Chief Complaint   Patient presents with   • stye left eye     Social History     Tobacco Use   • Smoking status: Never Smoker   • Smokeless tobacco: Never Used   Substance Use Topics   • Alcohol use: Yes     Comment: MODERATELY   • Drug use: No       History of Present Illness   Patient with 2-week history of left eye stye no improvement with antibiotics or warm compresses no known foreign body vision is good history of Lasik procedure  The following portions of the patient's history were reviewed and updated as appropriate:PMHroutine: Social history , Allergies, Current Medications, Active Problem List and Health Maintenance    Review of Systems   Constitutional: Negative.    Respiratory: Negative.        Objective   Vitals:    01/03/20 1255   BP: 124/80   BP Location: Left arm   Patient Position: Sitting   Cuff Size: Adult   Pulse: 75   Temp: 98.1 °F (36.7 °C)   TempSrc: Oral   SpO2: 98%   Weight: 59.9 kg (132 lb)   Height: 160 cm (63\")     Body mass index is 23.38 kg/m².  Physical Exam   Constitutional: She appears well-developed and well-nourished.   HENT:   Head: Normocephalic and atraumatic.   Eyes: Left eye exhibits no discharge.   Erythematous upper lid with nodule   Psychiatric: She has a normal mood and affect. Her behavior is normal. Thought content normal.   Nursing note and vitals reviewed.    Reviewed Data:  No visits with results within 1 Month(s) from this visit.   Latest known visit with results is:   Office Visit on 07/10/2019   Component Date Value Ref Range Status   • Glucose 07/10/2019 90  65 - 99 mg/dL Final   • BUN 07/10/2019 20  8 - 23 mg/dL Final   • Creatinine 07/10/2019 " 0.95  0.57 - 1.00 mg/dL Final   • eGFR Non African Am 07/10/2019 58* >60 mL/min/1.73 Final   • eGFR African Am 07/10/2019 71  >60 mL/min/1.73 Final   • BUN/Creatinine Ratio 07/10/2019 21.1  7.0 - 25.0 Final   • Sodium 07/10/2019 139  136 - 145 mmol/L Final   • Potassium 07/10/2019 5.0  3.5 - 5.2 mmol/L Final   • Chloride 07/10/2019 98  98 - 107 mmol/L Final   • Total CO2 07/10/2019 31.2* 22.0 - 29.0 mmol/L Final   • Calcium 07/10/2019 10.1  8.6 - 10.5 mg/dL Final

## 2020-07-13 ENCOUNTER — TELEPHONE (OUTPATIENT)
Dept: INTERNAL MEDICINE | Facility: CLINIC | Age: 70
End: 2020-07-13

## 2020-07-13 NOTE — TELEPHONE ENCOUNTER
PATIENT CALLED AND STATED SHE RECEIVED A CALL TO RESCHEDULE HER APPOINTMENT. COULD NOT SCHEDULE APPOINTMENT.  UNSUCCESSFUL IN TRANSFERRING TO THE OFFICE.  PLEASE CALL PATIENT TO SCHEDULE APPOINTMENT.    PATIENTS CALL BACK NUMBER [phone]

## 2020-07-31 ENCOUNTER — OFFICE VISIT (OUTPATIENT)
Dept: INTERNAL MEDICINE | Facility: CLINIC | Age: 70
End: 2020-07-31

## 2020-07-31 VITALS
BODY MASS INDEX: 23.18 KG/M2 | OXYGEN SATURATION: 98 % | WEIGHT: 130.8 LBS | HEIGHT: 63 IN | DIASTOLIC BLOOD PRESSURE: 84 MMHG | HEART RATE: 71 BPM | SYSTOLIC BLOOD PRESSURE: 128 MMHG

## 2020-07-31 DIAGNOSIS — N95.1 MENOPAUSAL VAGINAL DRYNESS: ICD-10-CM

## 2020-07-31 DIAGNOSIS — N95.2 ATROPHIC VAGINITIS: ICD-10-CM

## 2020-07-31 DIAGNOSIS — Z00.00 MEDICARE ANNUAL WELLNESS VISIT, SUBSEQUENT: ICD-10-CM

## 2020-07-31 DIAGNOSIS — F51.01 PRIMARY INSOMNIA: ICD-10-CM

## 2020-07-31 DIAGNOSIS — Z00.00 HEALTH CARE MAINTENANCE: ICD-10-CM

## 2020-07-31 DIAGNOSIS — M85.80 OSTEOPENIA, UNSPECIFIED LOCATION: Primary | ICD-10-CM

## 2020-07-31 PROBLEM — J40 BRONCHITIS: Status: RESOLVED | Noted: 2019-05-31 | Resolved: 2020-07-31

## 2020-07-31 PROBLEM — J06.9 ACUTE URI: Status: RESOLVED | Noted: 2019-05-31 | Resolved: 2020-07-31

## 2020-07-31 PROCEDURE — G0439 PPPS, SUBSEQ VISIT: HCPCS | Performed by: FAMILY MEDICINE

## 2020-07-31 PROCEDURE — 99397 PER PM REEVAL EST PAT 65+ YR: CPT | Performed by: FAMILY MEDICINE

## 2020-07-31 RX ORDER — ESTRADIOL 0.1 MG/G
2 CREAM VAGINAL EVERY OTHER DAY
Qty: 3 EACH | Refills: 4 | Status: SHIPPED | OUTPATIENT
Start: 2020-07-31 | End: 2022-09-01 | Stop reason: SDUPTHER

## 2020-07-31 RX ORDER — ZOLPIDEM TARTRATE 5 MG/1
5 TABLET ORAL NIGHTLY PRN
Qty: 90 TABLET | Refills: 0 | Status: SHIPPED | OUTPATIENT
Start: 2020-07-31 | End: 2022-09-01 | Stop reason: SDUPTHER

## 2020-07-31 NOTE — PROGRESS NOTES
The ABCs of the Annual Wellness Visit  Subsequent Medicare Wellness Visit    Chief Complaint   Patient presents with   • follow up to insomnia   • Medicare Wellness-subsequent       Subjective   History of Present Illness:  Eve Khan is a 69 y.o. female who presents for a Subsequent Medicare Wellness Visit.    HEALTH RISK ASSESSMENT    Recent Hospitalizations:  No hospitalization(s) within the last year.    Current Medical Providers:  Patient Care Team:  Yifan Douglas MD as PCP - General    Smoking Status:  Social History     Tobacco Use   Smoking Status Never Smoker   Smokeless Tobacco Never Used       Alcohol Consumption:  Social History     Substance and Sexual Activity   Alcohol Use Yes    Comment: MODERATELY       Depression Screen:   PHQ-2/PHQ-9 Depression Screening 7/31/2020   Little interest or pleasure in doing things 0   Feeling down, depressed, or hopeless 0   Total Score 0       Fall Risk Screen:  STEADI Fall Risk Assessment was completed, and patient is at LOW risk for falls.Assessment completed on:7/31/2020    Health Habits and Functional and Cognitive Screening:  Functional & Cognitive Status 7/31/2020   Do you have difficulty preparing food and eating? No   Do you have difficulty bathing yourself, getting dressed or grooming yourself? No   Do you have difficulty using the toilet? No   Do you have difficulty moving around from place to place? No   Do you have trouble with steps or getting out of a bed or a chair? No   Current Diet Well Balanced Diet   Dental Exam Up to date   Eye Exam Up to date   Exercise (times per week) 7 times per week   Current Exercise Activities Include Walking   Do you need help using the phone?  No   Are you deaf or do you have serious difficulty hearing?  No   Do you need help with transportation? No   Do you need help shopping? No   Do you need help preparing meals?  No   Do you need help with housework?  No   Do you need help with laundry? No   Do you need help  taking your medications? No   Do you need help managing money? No   Do you ever drive or ride in a car without wearing a seat belt? No   Have you felt unusual stress, anger or loneliness in the last month? No   Who do you live with? Spouse   If you need help, do you have trouble finding someone available to you? No   Have you been bothered in the last four weeks by sexual problems? No   Do you have difficulty concentrating, remembering or making decisions? No         Does the patient have evidence of cognitive impairment? No    Asprin use counseling:Does not need ASA (and currently is not on it)    Age-appropriate Screening Schedule:  Refer to the list below for future screening recommendations based on patient's age, sex and/or medical conditions. Orders for these recommended tests are listed in the plan section. The patient has been provided with a written plan.    Health Maintenance   Topic Date Due   • INFLUENZA VACCINE  08/01/2020   • MAMMOGRAM  09/20/2021   • DXA SCAN  09/20/2021   • TDAP/TD VACCINES (2 - Td) 06/15/2025   • COLONOSCOPY  09/29/2026   • ZOSTER VACCINE  Discontinued          The following portions of the patient's history were reviewed and updated as appropriate: allergies, current medications, past family history, past medical history, past social history, past surgical history and problem list.    Outpatient Medications Prior to Visit   Medication Sig Dispense Refill   • ALLERGY SERUM INJECTION Inject  under the skin into the appropriate area as directed 1 (One) Time. Once monthly     • budesonide-formoterol (SYMBICORT) 160-4.5 MCG/ACT inhaler Inhale 2 puffs 2 (Two) Times a Day As Needed.     • calcium-vitamin D (OSCAL 500/200 D-3) 500-200 MG-UNIT per tablet Take 1 tablet by mouth 2 (Two) Times a Day. 60 tablet 11   • Loratadine 10 MG capsule Take 1 capsule by mouth daily as needed.     • Magnesium 250 MG tablet Take 1 tablet by mouth Daily.     • spironolactone (ALDACTONE) 25 MG tablet Take 1  "tablet by mouth Daily. (Patient taking differently: Take 25 mg by mouth Daily. SUMMER ONLY) 90 tablet 1   • estradiol (ESTRACE) 0.1 MG/GM vaginal cream Insert 2 g into the vagina Every Other Day. INSERT 2 gm APPLICATORFUL VAGINALLY every other day 3 each 4   • zolpidem (AMBIEN) 5 MG tablet Take 1 tablet by mouth At Night As Needed for Sleep. 90 tablet 0     No facility-administered medications prior to visit.        Patient Active Problem List   Diagnosis   • Acne   • Arthritis   • Primary insomnia   • Osteopenia   • Fungal infection of toenail   • Arthropathy of temporomandibular joint   • Adenomatous polyp of colon   • Abdominal bruit   • Central serous chorioretinopathy of right eye   • Medicare annual wellness visit, subsequent   • Benign paroxysmal positional vertigo due to bilateral vestibular disorder   • Menopausal vaginal dryness   • Menopausal state   • Hordeolum externum of left upper eyelid   • Health care maintenance       Advanced Care Planning:  ACP discussion was held with the patient during this visit. Patient has an advance directive (not in EMR), copy requested.    Review of Systems    Compared to one year ago, the patient feels her physical health is the same.  Compared to one year ago, the patient feels her mental health is the same.    Reviewed chart for potential of high risk medication in the elderly: yes  Reviewed chart for potential of harmful drug interactions in the elderly:yes    Objective         Vitals:    07/31/20 1040   BP: 128/84   BP Location: Right arm   Patient Position: Sitting   Cuff Size: Adult   Pulse: 71   SpO2: 98%   Weight: 59.3 kg (130 lb 12.8 oz)   Height: 160 cm (63\")   PainSc: 0-No pain       Body mass index is 23.17 kg/m².  Discussed the patient's BMI with her. The BMI is in the acceptable range.    Physical Exam          Assessment/Plan   Medicare Risks and Personalized Health Plan  CMS Preventative Services Quick Reference  low risk all up to date    The above " risks/problems have been discussed with the patient.  Pertinent information has been shared with the patient in the After Visit Summary.  Follow up plans and orders are seen below in the Assessment/Plan Section.    Diagnoses and all orders for this visit:    1. Osteopenia, unspecified location (Primary)    2. Primary insomnia  -     zolpidem (Ambien) 5 MG tablet; Take 1 tablet by mouth At Night As Needed for Sleep.  Dispense: 90 tablet; Refill: 0    3. Medicare annual wellness visit, subsequent    4. Health care maintenance    5. Atrophic vaginitis  -     estradiol (ESTRACE) 0.1 MG/GM vaginal cream; Insert 2 g into the vagina Every Other Day. INSERT 2 gm APPLICATORFUL VAGINALLY every other day  Dispense: 3 each; Refill: 4    6. Menopausal vaginal dryness      Follow Up:  Return in about 1 year (around 7/31/2021), or if symptoms worsen or fail to improve, for Recheck, Annual physical, Medicare Wellness.     An After Visit Summary and PPPS were given to the patient.

## 2020-07-31 NOTE — PROGRESS NOTES
Subjective   Eve Khan is a 69 y.o. female.     Chief Complaint   Patient presents with   • follow up to insomnia   • Medicare Wellness-subsequent         History of Present Illness   Eve Khan 69 y.o. female who presents for an Annual Wellness Visit.  she has a history of   Patient Active Problem List   Diagnosis   • Acne   • Arthritis   • Primary insomnia   • Osteopenia   • Fungal infection of toenail   • Arthropathy of temporomandibular joint   • Adenomatous polyp of colon   • Abdominal bruit   • Central serous chorioretinopathy of right eye   • Medicare annual wellness visit, subsequent   • Benign paroxysmal positional vertigo due to bilateral vestibular disorder   • Menopausal vaginal dryness   • Menopausal state   • Hordeolum externum of left upper eyelid   • Health care maintenance   .  she has been feeling well.  Labs results discussed in detail with the patient.  Plan to update vaccines if needed today.  I  reviewed health maintenance with her as part of my preventative care plan.    Health Habits:  Dental Exam. up to date  Eye Exam. up to date  Exercise: 6 times/week.  Current exercise activities include: walking      The following portions of the patient's history were reviewed and updated as appropriate: allergies, current medications, past family history, past medical history, past social history, past surgical history and problem list.    Review of Systems   Constitutional: Negative for appetite change, fever and unexpected weight change.   HENT: Negative for ear pain, facial swelling and sore throat.    Eyes: Negative for pain and visual disturbance.   Respiratory: Negative for chest tightness, shortness of breath and wheezing.    Cardiovascular: Negative for chest pain and palpitations.   Gastrointestinal: Negative for abdominal pain and blood in stool.   Endocrine: Negative.    Genitourinary: Negative for difficulty urinating, genital sores, hematuria, vaginal bleeding and vaginal  discharge.   Musculoskeletal: Negative for joint swelling.   Neurological: Negative for tremors, seizures and syncope.   Hematological: Negative for adenopathy.   Psychiatric/Behavioral: Negative.        Objective   Physical Exam   Constitutional: She is oriented to person, place, and time. She appears well-developed and well-nourished.   HENT:   Head: Normocephalic and atraumatic.   Eyes: EOM are normal. No scleral icterus.   Neck: No tracheal deviation present. No thyromegaly present.   Cardiovascular: Normal rate and regular rhythm.   Pulmonary/Chest: Effort normal and breath sounds normal.   Musculoskeletal: She exhibits no edema or tenderness.   Neurological: She is alert and oriented to person, place, and time.   Skin: Skin is warm and dry.   Psychiatric: She has a normal mood and affect. Her behavior is normal. Judgment and thought content normal.   Nursing note and vitals reviewed.      Assessment/Plan   Eve was seen today for follow up to insomnia and medicare wellness-subsequent.    Diagnoses and all orders for this visit:    Osteopenia, unspecified location    Primary insomnia  -     zolpidem (Ambien) 5 MG tablet; Take 1 tablet by mouth At Night As Needed for Sleep.    Medicare annual wellness visit, subsequent    Health care maintenance    Atrophic vaginitis  -     estradiol (ESTRACE) 0.1 MG/GM vaginal cream; Insert 2 g into the vagina Every Other Day. INSERT 2 gm APPLICATORFUL VAGINALLY every other day    Menopausal vaginal dryness      Continue healthy lifestyle calorie appropriate diet regular physical activities screening tests up-to-date follow-up otherwise as needed continue present treatment of insomnia with intermittent zolpidem as well as Estrace for vaginal dryness

## 2020-08-10 ENCOUNTER — TRANSCRIBE ORDERS (OUTPATIENT)
Dept: ADMINISTRATIVE | Facility: HOSPITAL | Age: 70
End: 2020-08-10

## 2020-08-10 DIAGNOSIS — Z12.31 VISIT FOR SCREENING MAMMOGRAM: Primary | ICD-10-CM

## 2020-09-09 RX ORDER — SPIRONOLACTONE 25 MG/1
TABLET ORAL
Qty: 90 TABLET | Refills: 3 | Status: SHIPPED | OUTPATIENT
Start: 2020-09-09 | End: 2021-08-02 | Stop reason: SDUPTHER

## 2020-09-21 ENCOUNTER — HOSPITAL ENCOUNTER (OUTPATIENT)
Dept: MAMMOGRAPHY | Facility: HOSPITAL | Age: 70
Discharge: HOME | End: 2020-09-21
Admitting: FAMILY MEDICINE

## 2020-09-21 DIAGNOSIS — Z12.31 VISIT FOR SCREENING MAMMOGRAM: ICD-10-CM

## 2020-09-21 PROCEDURE — 77067 SCR MAMMO BI INCL CAD: CPT

## 2020-09-21 PROCEDURE — 77063 BREAST TOMOSYNTHESIS BI: CPT

## 2020-09-24 ENCOUNTER — CLINICAL SUPPORT (OUTPATIENT)
Dept: INTERNAL MEDICINE | Facility: CLINIC | Age: 70
End: 2020-09-24

## 2020-09-24 DIAGNOSIS — Z23 NEED FOR INFLUENZA VACCINATION: Primary | ICD-10-CM

## 2020-09-24 PROCEDURE — G0008 ADMIN INFLUENZA VIRUS VAC: HCPCS | Performed by: FAMILY MEDICINE

## 2020-09-24 PROCEDURE — 90694 VACC AIIV4 NO PRSRV 0.5ML IM: CPT | Performed by: FAMILY MEDICINE

## 2021-03-09 DIAGNOSIS — Z23 IMMUNIZATION DUE: ICD-10-CM

## 2021-04-22 ENCOUNTER — OFFICE VISIT (OUTPATIENT)
Dept: INTERNAL MEDICINE | Facility: CLINIC | Age: 71
End: 2021-04-22

## 2021-04-22 ENCOUNTER — HOSPITAL ENCOUNTER (OUTPATIENT)
Dept: GENERAL RADIOLOGY | Facility: HOSPITAL | Age: 71
Discharge: HOME | End: 2021-04-22
Admitting: FAMILY MEDICINE

## 2021-04-22 VITALS
HEIGHT: 63 IN | OXYGEN SATURATION: 98 % | WEIGHT: 133.2 LBS | DIASTOLIC BLOOD PRESSURE: 94 MMHG | HEART RATE: 78 BPM | BODY MASS INDEX: 23.6 KG/M2 | SYSTOLIC BLOOD PRESSURE: 144 MMHG

## 2021-04-22 DIAGNOSIS — M25.531 RIGHT WRIST PAIN: Primary | ICD-10-CM

## 2021-04-22 PROCEDURE — 73110 X-RAY EXAM OF WRIST: CPT

## 2021-04-22 PROCEDURE — 99213 OFFICE O/P EST LOW 20 MIN: CPT | Performed by: FAMILY MEDICINE

## 2021-04-22 NOTE — PROGRESS NOTES
"Chief Complaint  painful/weak right wrist (about 6 weeks)    Subjective          Eve Khan presents to [facility]  History of Present Illness  Patient acute visit for wrist pain developed over the last 6 weeks no specific injury decreased range of motion tenderness with lateral flexion extension medial movement although if she lifts with out any angulation there is no pain does not have a significant history of arthritis occasional musculoskeletal aches and pains for which she takes ibuprofen  No gastrointestinal issues  Objective   Vital Signs:   /94 (BP Location: Right arm, Patient Position: Sitting, Cuff Size: Adult)   Pulse 78   Ht 160 cm (63\")   Wt 60.4 kg (133 lb 3.2 oz)   SpO2 98%   BMI 23.60 kg/m²     Physical Exam  Constitutional:       Appearance: Normal appearance.   Musculoskeletal:      Right elbow: Normal.      Right wrist: Effusion present. No swelling, deformity, lacerations, tenderness, bony tenderness, snuff box tenderness or crepitus. Decreased range of motion. Normal pulse.   Neurological:      Mental Status: She is alert.        Result Review :                 Assessment and Plan    Diagnoses and all orders for this visit:    1. Right wrist pain (Primary)  -     XR Wrist 3+ View Right    Ibuprofen 400 mg 3 times a day for 2 weeks follow-up if no improvement otherwise results of blood work    Follow Up   Return if symptoms worsen or fail to improve.  Patient was given instructions and counseling regarding her condition or for health maintenance advice. Please see specific information pulled into the AVS if appropriate.       "

## 2021-05-03 ENCOUNTER — OFFICE VISIT (OUTPATIENT)
Dept: INTERNAL MEDICINE | Facility: CLINIC | Age: 71
End: 2021-05-03

## 2021-05-03 VITALS
WEIGHT: 134.4 LBS | HEIGHT: 63 IN | SYSTOLIC BLOOD PRESSURE: 132 MMHG | HEART RATE: 80 BPM | DIASTOLIC BLOOD PRESSURE: 74 MMHG | BODY MASS INDEX: 23.81 KG/M2 | OXYGEN SATURATION: 99 %

## 2021-05-03 DIAGNOSIS — M25.531 WRIST PAIN, ACUTE, RIGHT: Primary | ICD-10-CM

## 2021-05-03 PROCEDURE — 99213 OFFICE O/P EST LOW 20 MIN: CPT | Performed by: FAMILY MEDICINE

## 2021-05-03 NOTE — PROGRESS NOTES
"Chief Complaint  right wrist pain - going up arm    Subjective          Eve Khan presents to [facility]  History of Present Illness  Patient with persistent right wrist pain x-ray reveals spurring of the trapezium and a soft tissue mass dorsal aspect she has been taking ibuprofen 3 times a day and still has persistent pain minimal improvement no recollection of trauma  Objective   Vital Signs:   /74 (BP Location: Right arm, Patient Position: Sitting, Cuff Size: Adult)   Pulse 80   Ht 160 cm (63\")   Wt 61 kg (134 lb 6.4 oz)   SpO2 99%   BMI 23.81 kg/m²     Physical Exam  Constitutional:       Appearance: Normal appearance.   Musculoskeletal:         General: Tenderness present.   Skin:     General: Skin is warm and dry.   Psychiatric:         Mood and Affect: Mood normal.         Behavior: Behavior normal.         Thought Content: Thought content normal.        Result Review :                 Assessment and Plan    Diagnoses and all orders for this visit:    1. Wrist pain, acute, right (Primary)  -     MRI wrist right wo contrast; Future    Recommend consultation with Dr. Grace    Follow Up   Return if symptoms worsen or fail to improve, for Recheck.  Patient was given instructions and counseling regarding her condition or for health maintenance advice. Please see specific information pulled into the AVS if appropriate.       "

## 2021-06-04 ENCOUNTER — HOSPITAL ENCOUNTER (OUTPATIENT)
Dept: MRI IMAGING | Facility: HOSPITAL | Age: 71
Discharge: HOME | End: 2021-06-04
Admitting: FAMILY MEDICINE

## 2021-06-04 DIAGNOSIS — M25.531 WRIST PAIN, ACUTE, RIGHT: ICD-10-CM

## 2021-06-04 PROCEDURE — 73221 MRI JOINT UPR EXTREM W/O DYE: CPT

## 2021-07-13 ENCOUNTER — PREP FOR SURGERY (OUTPATIENT)
Dept: OTHER | Facility: HOSPITAL | Age: 71
End: 2021-07-13

## 2021-07-13 ENCOUNTER — TELEPHONE (OUTPATIENT)
Dept: GASTROENTEROLOGY | Facility: CLINIC | Age: 71
End: 2021-07-13

## 2021-07-13 DIAGNOSIS — K63.5 COLON POLYPS: Primary | ICD-10-CM

## 2021-07-13 NOTE — TELEPHONE ENCOUNTER
Last scope 09/29/2016 in [facility]-- personal hx of polyps-- no family hx of polyps or colon ca-- no ASA or blood thinners-- medications:    estradiol (ESTRACE) 0.1 MG/GM vaginal cream  Magnesium 250 MG tablet  spironolactone (ALDACTONE) 25 MG tablet  zolpidem (Ambien) 5 MG tablet  Preservision  Multivitamin  Calcium Citrate   Tart Cherry Tabs    OA form scanned into media

## 2021-07-19 ENCOUNTER — TELEPHONE (OUTPATIENT)
Dept: GASTROENTEROLOGY | Facility: CLINIC | Age: 71
End: 2021-07-19

## 2021-07-19 PROBLEM — K63.5 COLON POLYPS: Status: ACTIVE | Noted: 2021-07-19

## 2021-08-02 ENCOUNTER — LAB (OUTPATIENT)
Dept: LAB | Facility: HOSPITAL | Age: 71
End: 2021-08-02

## 2021-08-02 ENCOUNTER — OFFICE VISIT (OUTPATIENT)
Dept: INTERNAL MEDICINE | Facility: CLINIC | Age: 71
End: 2021-08-02

## 2021-08-02 VITALS
OXYGEN SATURATION: 99 % | DIASTOLIC BLOOD PRESSURE: 80 MMHG | WEIGHT: 129.8 LBS | BODY MASS INDEX: 23 KG/M2 | HEART RATE: 73 BPM | HEIGHT: 63 IN | SYSTOLIC BLOOD PRESSURE: 132 MMHG

## 2021-08-02 DIAGNOSIS — E55.9 VITAMIN D DEFICIENCY, UNSPECIFIED: ICD-10-CM

## 2021-08-02 DIAGNOSIS — R53.83 OTHER FATIGUE: ICD-10-CM

## 2021-08-02 DIAGNOSIS — M85.88 OTHER SPECIFIED DISORDERS OF BONE DENSITY AND STRUCTURE, OTHER SITE: ICD-10-CM

## 2021-08-02 DIAGNOSIS — M85.80 OSTEOPENIA, UNSPECIFIED LOCATION: ICD-10-CM

## 2021-08-02 DIAGNOSIS — F51.01 PRIMARY INSOMNIA: ICD-10-CM

## 2021-08-02 DIAGNOSIS — Z00.00 MEDICARE ANNUAL WELLNESS VISIT, SUBSEQUENT: Primary | ICD-10-CM

## 2021-08-02 DIAGNOSIS — R60.0 LOCALIZED EDEMA: ICD-10-CM

## 2021-08-02 LAB
25(OH)D3 SERPL-MCNC: 51.8 NG/ML (ref 30–100)
ANION GAP SERPL CALCULATED.3IONS-SCNC: 9.1 MMOL/L (ref 5–15)
BASOPHILS # BLD AUTO: 0.07 10*3/MM3 (ref 0–0.2)
BASOPHILS NFR BLD AUTO: 1.5 % (ref 0–1.5)
BUN SERPL-MCNC: 16 MG/DL (ref 8–23)
BUN/CREAT SERPL: 19 (ref 7–25)
CALCIUM SPEC-SCNC: 9.7 MG/DL (ref 8.6–10.5)
CHLORIDE SERPL-SCNC: 102 MMOL/L (ref 98–107)
CO2 SERPL-SCNC: 26.9 MMOL/L (ref 22–29)
CREAT SERPL-MCNC: 0.84 MG/DL (ref 0.57–1)
DEPRECATED RDW RBC AUTO: 41 FL (ref 37–54)
EOSINOPHIL # BLD AUTO: 0.15 10*3/MM3 (ref 0–0.4)
EOSINOPHIL NFR BLD AUTO: 3.1 % (ref 0.3–6.2)
ERYTHROCYTE [DISTWIDTH] IN BLOOD BY AUTOMATED COUNT: 12.8 % (ref 12.3–15.4)
GFR SERPL CREATININE-BSD FRML MDRD: 67 ML/MIN/1.73
GLUCOSE SERPL-MCNC: 85 MG/DL (ref 65–99)
HCT VFR BLD AUTO: 40.4 % (ref 34–46.6)
HGB BLD-MCNC: 13.3 G/DL (ref 12–15.9)
IMM GRANULOCYTES # BLD AUTO: 0.02 10*3/MM3 (ref 0–0.05)
IMM GRANULOCYTES NFR BLD AUTO: 0.4 % (ref 0–0.5)
LYMPHOCYTES # BLD AUTO: 1.31 10*3/MM3 (ref 0.7–3.1)
LYMPHOCYTES NFR BLD AUTO: 27.2 % (ref 19.6–45.3)
MCH RBC QN AUTO: 29.2 PG (ref 26.6–33)
MCHC RBC AUTO-ENTMCNC: 32.9 G/DL (ref 31.5–35.7)
MCV RBC AUTO: 88.8 FL (ref 79–97)
MONOCYTES # BLD AUTO: 0.62 10*3/MM3 (ref 0.1–0.9)
MONOCYTES NFR BLD AUTO: 12.9 % (ref 5–12)
NEUTROPHILS NFR BLD AUTO: 2.65 10*3/MM3 (ref 1.7–7)
NEUTROPHILS NFR BLD AUTO: 54.9 % (ref 42.7–76)
NRBC BLD AUTO-RTO: 0 /100 WBC (ref 0–0.2)
PLATELET # BLD AUTO: 215 10*3/MM3 (ref 140–450)
PMV BLD AUTO: 12.2 FL (ref 6–12)
POTASSIUM SERPL-SCNC: 4.6 MMOL/L (ref 3.5–5.2)
RBC # BLD AUTO: 4.55 10*6/MM3 (ref 3.77–5.28)
SODIUM SERPL-SCNC: 138 MMOL/L (ref 136–145)
TSH SERPL DL<=0.05 MIU/L-ACNC: 1.19 UIU/ML (ref 0.27–4.2)
WBC # BLD AUTO: 4.82 10*3/MM3 (ref 3.4–10.8)

## 2021-08-02 PROCEDURE — 1170F FXNL STATUS ASSESSED: CPT | Performed by: FAMILY MEDICINE

## 2021-08-02 PROCEDURE — 85025 COMPLETE CBC W/AUTO DIFF WBC: CPT | Performed by: FAMILY MEDICINE

## 2021-08-02 PROCEDURE — 1160F RVW MEDS BY RX/DR IN RCRD: CPT | Performed by: FAMILY MEDICINE

## 2021-08-02 PROCEDURE — 99214 OFFICE O/P EST MOD 30 MIN: CPT | Performed by: FAMILY MEDICINE

## 2021-08-02 PROCEDURE — 36415 COLL VENOUS BLD VENIPUNCTURE: CPT | Performed by: FAMILY MEDICINE

## 2021-08-02 PROCEDURE — 1126F AMNT PAIN NOTED NONE PRSNT: CPT | Performed by: FAMILY MEDICINE

## 2021-08-02 PROCEDURE — 82306 VITAMIN D 25 HYDROXY: CPT | Performed by: FAMILY MEDICINE

## 2021-08-02 PROCEDURE — 84443 ASSAY THYROID STIM HORMONE: CPT | Performed by: FAMILY MEDICINE

## 2021-08-02 PROCEDURE — G0439 PPPS, SUBSEQ VISIT: HCPCS | Performed by: FAMILY MEDICINE

## 2021-08-02 PROCEDURE — 80048 BASIC METABOLIC PNL TOTAL CA: CPT | Performed by: FAMILY MEDICINE

## 2021-08-02 RX ORDER — IBUPROFEN 200 MG
200 TABLET ORAL DAILY PRN
COMMUNITY

## 2021-08-02 RX ORDER — SPIRONOLACTONE 25 MG/1
25 TABLET ORAL DAILY
Qty: 90 TABLET | Refills: 0 | Status: SHIPPED | OUTPATIENT
Start: 2021-08-02 | End: 2022-09-01 | Stop reason: ALTCHOICE

## 2021-08-02 NOTE — PROGRESS NOTES
"Chief Complaint  follow up to insomnia and Medicare Wellness-subsequent    Subjective          Eve Khan presents to [facility]  History of Present Illness  Patient follow-up appointment for ongoing management of insomnia localized edema fatigue  Her usual dose of Ambien is 1/2 pill that she takes when she cannot get a good night sleep but she feels this is not providing adequate sleep for her she is hesitant to take Benadryl because she has heard the long-term side effects of dementia however we also discussed the side effects of poor sleep which can also contribute to dementia and she is only getting about 4 hours of sleep which has been a chronic problem but she seem to do much better with the Benadryl than the Ambien  Use of spironolactone for intermittent fluid retention mostly in the summertime compared to winter she is fairly active walk around the house she also walks regularly and has adequate exercise her wrist is much improved she is in need of monitoring osteopenia your bone density test.  Her fatigue is mostly manifest mid afternoon not necessarily after a heavy meal although it might be more related to when she does not get a good night sleep is when she has had a great night sleep for 9 hours then she is much more rested throughout the day.  She has no shortness of breath or chest pain or dizziness with exertion and no specific change in her endurance  Objective   Vital Signs:   /80 (BP Location: Right arm, Patient Position: Sitting, Cuff Size: Adult)   Pulse 73   Ht 160 cm (63\")   Wt 58.9 kg (129 lb 12.8 oz)   SpO2 99%   BMI 22.99 kg/m²     Physical Exam  Constitutional:       Appearance: Normal appearance.   HENT:      Head: Normocephalic and atraumatic.   Eyes:      General: No scleral icterus.  Cardiovascular:      Rate and Rhythm: Normal rate and regular rhythm.      Pulses: Normal pulses.      Heart sounds: Normal heart sounds.   Abdominal:      " Tenderness: There is no right CVA tenderness or left CVA tenderness.   Musculoskeletal:      Cervical back: Normal range of motion and neck supple.      Right lower leg: No edema.      Left lower leg: No edema.   Skin:     General: Skin is warm and dry.   Neurological:      Mental Status: She is alert.   Psychiatric:         Mood and Affect: Mood normal.         Behavior: Behavior normal.         Thought Content: Thought content normal.         Judgment: Judgment normal.        Result Review :                   Assessment and Plan    Diagnoses and all orders for this visit:    1. Medicare annual wellness visit, subsequent (Primary)    2. Primary insomnia    3. Osteopenia, unspecified location  -     DEXA Bone Density Axial; Future    4. Other fatigue  -     CBC & Differential  -     TSH  -     Vitamin D 25 Hydroxy    5. Localized edema  -     Basic Metabolic Panel  -     spironolactone (ALDACTONE) 25 MG tablet; Take 1 tablet by mouth Daily.  Dispense: 90 tablet; Refill: 0    6. Other specified disorders of bone density and structure, other site   -     DEXA Bone Density Axial; Future    7. Vitamin D deficiency, unspecified   -     Vitamin D 25 Hydroxy    Insomnia take a whole dose of Ambien when he is taking otherwise take Benadryl nightly approximately 10 PM if she goes to bed at 11 do not take Ambien on those nights  Intermittent use of spironolactone for lower extremity edema  Follow-up results of DEXA scan and mammogram which are due the end of September otherwise as needed or    Follow Up   Return in about 6 months (around 2/2/2022), or if symptoms worsen or fail to improve, for Recheck.  Patient was given instructions and counseling regarding her condition or for health maintenance advice. Please see specific information pulled into the AVS if appropriate.

## 2021-08-02 NOTE — PROGRESS NOTES
The ABCs of the Annual Wellness Visit  Subsequent Medicare Wellness Visit    Chief Complaint   Patient presents with   • follow up to insomnia   • Medicare Wellness-subsequent       Subjective   History of Present Illness:  Eve Khan is a 70 y.o. female who presents for a Subsequent Medicare Wellness Visit.    HEALTH RISK ASSESSMENT    Recent Hospitalizations:  No hospitalization(s) within the last year.    Current Medical Providers:  Patient Care Team:  Yifan Douglas MD as PCP - General    Smoking Status:  Social History     Tobacco Use   Smoking Status Never Smoker   Smokeless Tobacco Never Used       Alcohol Consumption:  Social History     Substance and Sexual Activity   Alcohol Use Yes    Comment: MODERATELY       Depression Screen:   PHQ-2/PHQ-9 Depression Screening 8/2/2021   Little interest or pleasure in doing things 0   Feeling down, depressed, or hopeless 0   Total Score 0       Fall Risk Screen:  STEADI Fall Risk Assessment was completed, and patient is at LOW risk for falls.Assessment completed on:8/2/2021    Health Habits and Functional and Cognitive Screening:  Functional & Cognitive Status 8/2/2021   Do you have difficulty preparing food and eating? No   Do you have difficulty bathing yourself, getting dressed or grooming yourself? No   Do you have difficulty using the toilet? No   Do you have difficulty moving around from place to place? No   Do you have trouble with steps or getting out of a bed or a chair? No   Current Diet Well Balanced Diet   Dental Exam Up to date   Eye Exam Up to date   Exercise (times per week) 6 times per week   Current Exercises Include Walking   Current Exercise Activities Include -   Do you need help using the phone?  No   Are you deaf or do you have serious difficulty hearing?  No   Do you need help with transportation? No   Do you need help shopping? No   Do you need help preparing meals?  No   Do you need help with housework?  No   Do you need help with  laundry? No   Do you need help taking your medications? No   Do you need help managing money? No   Do you ever drive or ride in a car without wearing a seat belt? No   Have you felt unusual stress, anger or loneliness in the last month? No   Who do you live with? Spouse   If you need help, do you have trouble finding someone available to you? No   Have you been bothered in the last four weeks by sexual problems? No   Do you have difficulty concentrating, remembering or making decisions? No         Does the patient have evidence of cognitive impairment? No    Asprin use counseling:Does not need ASA (and currently is not on it)    Age-appropriate Screening Schedule:  Refer to the list below for future screening recommendations based on patient's age, sex and/or medical conditions. Orders for these recommended tests are listed in the plan section. The patient has been provided with a written plan.    Health Maintenance   Topic Date Due   • DXA SCAN  09/20/2021   • INFLUENZA VACCINE  10/01/2021   • MAMMOGRAM  09/21/2022   • TDAP/TD VACCINES (2 - Td or Tdap) 06/15/2025   • ZOSTER VACCINE  Discontinued          The following portions of the patient's history were reviewed and updated as appropriate: allergies, current medications, past family history, past medical history, past social history, past surgical history and problem list.    Outpatient Medications Prior to Visit   Medication Sig Dispense Refill   • ALLERGY SERUM INJECTION Inject  under the skin into the appropriate area as directed 1 (One) Time. Once monthly     • estradiol (ESTRACE) 0.1 MG/GM vaginal cream Insert 2 g into the vagina Every Other Day. INSERT 2 gm APPLICATORFUL VAGINALLY every other day 3 each 4   • ibuprofen (ADVIL,MOTRIN) 200 MG tablet Take 200 mg by mouth Daily As Needed for Mild Pain .     • Loratadine 10 MG capsule Take 1 capsule by mouth daily as needed.     • Magnesium 250 MG tablet Take 1 tablet by mouth Daily.     • zolpidem (Ambien) 5  "MG tablet Take 1 tablet by mouth At Night As Needed for Sleep. 90 tablet 0   • spironolactone (ALDACTONE) 25 MG tablet TAKE 1 TABLET DAILY 90 tablet 3   • budesonide-formoterol (SYMBICORT) 160-4.5 MCG/ACT inhaler Inhale 2 puffs 2 (Two) Times a Day As Needed.       No facility-administered medications prior to visit.       Patient Active Problem List   Diagnosis   • Acne   • Arthritis   • Primary insomnia   • Osteopenia   • Fungal infection of toenail   • Arthropathy of temporomandibular joint   • Adenomatous polyp of colon   • Abdominal bruit   • Central serous chorioretinopathy of right eye   • Medicare annual wellness visit, subsequent   • Benign paroxysmal positional vertigo due to bilateral vestibular disorder   • Localized edema   • Menopausal vaginal dryness   • Menopausal state   • Hordeolum externum of left upper eyelid   • Health care maintenance   • Colon polyps   • Other fatigue       Advanced Care Planning:  ACP discussion was held with the patient during this visit. Patient has an advance directive (not in EMR), copy requested.    Review of Systems    Compared to one year ago, the patient feels her physical health is worse.  Compared to one year ago, the patient feels her mental health is the same.    Reviewed chart for potential of high risk medication in the elderly: yes  Reviewed chart for potential of harmful drug interactions in the elderly:yes    Objective         Vitals:    08/02/21 1002   BP: 132/80   BP Location: Right arm   Patient Position: Sitting   Cuff Size: Adult   Pulse: 73   SpO2: 99%   Weight: 58.9 kg (129 lb 12.8 oz)   Height: 160 cm (63\")   PainSc: 0-No pain       Body mass index is 22.99 kg/m².  Discussed the patient's BMI with her. The BMI is in the acceptable range.    Physical Exam          Assessment/Plan   Medicare Risks and Personalized Health Plan  CMS Preventative Services Quick Reference  Osteoporosis Risk    The above risks/problems have been discussed with the " patient.  Pertinent information has been shared with the patient in the After Visit Summary.  Follow up plans and orders are seen below in the Assessment/Plan Section.    Diagnoses and all orders for this visit:    1. Medicare annual wellness visit, subsequent (Primary)    2. Primary insomnia    3. Osteopenia, unspecified location  -     DEXA Bone Density Axial; Future    4. Other fatigue  -     CBC & Differential  -     TSH  -     Vitamin D 25 Hydroxy    5. Localized edema  -     Basic Metabolic Panel  -     spironolactone (ALDACTONE) 25 MG tablet; Take 1 tablet by mouth Daily.  Dispense: 90 tablet; Refill: 0    6. Other specified disorders of bone density and structure, other site   -     DEXA Bone Density Axial; Future    7. Vitamin D deficiency, unspecified   -     Vitamin D 25 Hydroxy      Follow Up:  Return in about 6 months (around 2/2/2022), or if symptoms worsen or fail to improve, for Recheck.     An After Visit Summary and PPPS were given to the patient.     Ongoing management of chronic medical problems

## 2021-08-05 ENCOUNTER — TRANSCRIBE ORDERS (OUTPATIENT)
Dept: ADMINISTRATIVE | Facility: HOSPITAL | Age: 71
End: 2021-08-05

## 2021-08-05 DIAGNOSIS — Z12.31 VISIT FOR SCREENING MAMMOGRAM: Primary | ICD-10-CM

## 2021-09-25 ENCOUNTER — HOSPITAL ENCOUNTER (OUTPATIENT)
Dept: BONE DENSITY | Facility: HOSPITAL | Age: 71
Discharge: HOME | End: 2021-09-25

## 2021-09-25 ENCOUNTER — HOSPITAL ENCOUNTER (OUTPATIENT)
Dept: MAMMOGRAPHY | Facility: HOSPITAL | Age: 71
Discharge: HOME | End: 2021-09-25

## 2021-09-25 DIAGNOSIS — Z12.31 VISIT FOR SCREENING MAMMOGRAM: ICD-10-CM

## 2021-09-25 DIAGNOSIS — M85.80 OSTEOPENIA, UNSPECIFIED LOCATION: ICD-10-CM

## 2021-09-25 DIAGNOSIS — M85.88 OTHER SPECIFIED DISORDERS OF BONE DENSITY AND STRUCTURE, OTHER SITE: ICD-10-CM

## 2021-09-25 PROCEDURE — 77063 BREAST TOMOSYNTHESIS BI: CPT

## 2021-09-25 PROCEDURE — 77067 SCR MAMMO BI INCL CAD: CPT

## 2021-09-25 PROCEDURE — 77080 DXA BONE DENSITY AXIAL: CPT

## 2021-10-06 ENCOUNTER — TELEPHONE (OUTPATIENT)
Dept: INTERNAL MEDICINE | Facility: CLINIC | Age: 71
End: 2021-10-06

## 2021-10-06 NOTE — TELEPHONE ENCOUNTER
Caller: Eve Khan    Relationship: Self    Best call back number: [phone]      Who are you requesting to speak with (clinical staff, provider,  specific staff member): MELLISSA     What was the call regarding: QUESTIONS ABOUT COVID     Do you require a callback: YES

## 2021-11-02 ENCOUNTER — ANESTHESIA (OUTPATIENT)
Dept: GASTROENTEROLOGY | Facility: HOSPITAL | Age: 71
End: 2021-11-02

## 2021-11-02 ENCOUNTER — HOSPITAL ENCOUNTER (OUTPATIENT)
Facility: HOSPITAL | Age: 71
Setting detail: HOSPITAL OUTPATIENT SURGERY
Discharge: HOME | End: 2021-11-02
Attending: INTERNAL MEDICINE | Admitting: INTERNAL MEDICINE

## 2021-11-02 ENCOUNTER — ANESTHESIA EVENT (OUTPATIENT)
Dept: GASTROENTEROLOGY | Facility: HOSPITAL | Age: 71
End: 2021-11-02

## 2021-11-02 VITALS
DIASTOLIC BLOOD PRESSURE: 76 MMHG | OXYGEN SATURATION: 100 % | WEIGHT: 128.3 LBS | BODY MASS INDEX: 22.73 KG/M2 | SYSTOLIC BLOOD PRESSURE: 119 MMHG | HEART RATE: 69 BPM | HEIGHT: 63 IN | RESPIRATION RATE: 16 BRPM

## 2021-11-02 DIAGNOSIS — K63.5 COLON POLYPS: ICD-10-CM

## 2021-11-02 PROCEDURE — S0260 H&P FOR SURGERY: HCPCS | Performed by: INTERNAL MEDICINE

## 2021-11-02 PROCEDURE — 45380 COLONOSCOPY AND BIOPSY: CPT | Performed by: INTERNAL MEDICINE

## 2021-11-02 PROCEDURE — 25010000002 PROPOFOL 10 MG/ML EMULSION: Performed by: ANESTHESIOLOGY

## 2021-11-02 PROCEDURE — 88305 TISSUE EXAM BY PATHOLOGIST: CPT | Performed by: INTERNAL MEDICINE

## 2021-11-02 RX ORDER — SODIUM CHLORIDE, SODIUM LACTATE, POTASSIUM CHLORIDE, CALCIUM CHLORIDE 600; 310; 30; 20 MG/100ML; MG/100ML; MG/100ML; MG/100ML
1000 INJECTION, SOLUTION INTRAVENOUS CONTINUOUS
Status: DISCONTINUED | OUTPATIENT
Start: 2021-11-02 | End: 2021-11-02 | Stop reason: HOSPADM

## 2021-11-02 RX ORDER — PROPOFOL 10 MG/ML
VIAL (ML) INTRAVENOUS CONTINUOUS PRN
Status: DISCONTINUED | OUTPATIENT
Start: 2021-11-02 | End: 2021-11-02 | Stop reason: SURG

## 2021-11-02 RX ORDER — SODIUM CHLORIDE 0.9 % (FLUSH) 0.9 %
10 SYRINGE (ML) INJECTION AS NEEDED
Status: DISCONTINUED | OUTPATIENT
Start: 2021-11-02 | End: 2021-11-02 | Stop reason: HOSPADM

## 2021-11-02 RX ORDER — PROPOFOL 10 MG/ML
VIAL (ML) INTRAVENOUS AS NEEDED
Status: DISCONTINUED | OUTPATIENT
Start: 2021-11-02 | End: 2021-11-02 | Stop reason: SURG

## 2021-11-02 RX ADMIN — SODIUM CHLORIDE, POTASSIUM CHLORIDE, SODIUM LACTATE AND CALCIUM CHLORIDE 1000 ML: 600; 310; 30; 20 INJECTION, SOLUTION INTRAVENOUS at 12:15

## 2021-11-02 RX ADMIN — Medication 160 MCG/KG/MIN: at 13:04

## 2021-11-02 RX ADMIN — PROPOFOL 150 MG: 10 INJECTION, EMULSION INTRAVENOUS at 13:04

## 2021-11-02 NOTE — DISCHARGE INSTRUCTIONS

## 2021-11-02 NOTE — ANESTHESIA PREPROCEDURE EVALUATION
Anesthesia Evaluation     Patient summary reviewed and Nursing notes reviewed                Airway   Mallampati: I  TM distance: <3 FB  Neck ROM: full  no difficulty expected  Dental - normal exam     Pulmonary - negative pulmonary ROS and normal exam   Cardiovascular - negative cardio ROS and normal exam        Neuro/Psych- negative ROS  GI/Hepatic/Renal/Endo - negative ROS     Musculoskeletal (-) negative ROS    Abdominal  - normal exam    Bowel sounds: normal.   Substance History - negative use     OB/GYN negative ob/gyn ROS         Other                          Anesthesia Plan    ASA 2     MAC   (D/W pt. MAC and possible awareness intra op.  Pt understands MAC and GA are not the same and the possibility of GA being required for failed MAC)  intravenous induction     Anesthetic plan, all risks, benefits, and alternatives have been provided, discussed and informed consent has been obtained with: patient.

## 2021-11-02 NOTE — H&P
"[facility]  Pre Procedure History & Physical    Chief Complaint:   Time for my colonoscopy    Subjective     HPI:   71 y.o. female     Past Medical History:   Past Medical History:   Diagnosis Date   • Arthritis    • Colon polyp        Family History:  Family History   Problem Relation Age of Onset   • Muscular dystrophy Mother    • Heart disease Maternal Grandfather        Social History:   reports that she has never smoked. She has never used smokeless tobacco. She reports current alcohol use. She reports that she does not use drugs.    Medications:   Medications Prior to Admission   Medication Sig Dispense Refill Last Dose   • ALLERGY SERUM INJECTION Inject  under the skin into the appropriate area as directed 1 (One) Time. Once monthly   Past Month at Unknown time   • estradiol (ESTRACE) 0.1 MG/GM vaginal cream Insert 2 g into the vagina Every Other Day. INSERT 2 gm APPLICATORFUL VAGINALLY every other day 3 each 4 Past Week at Unknown time   • Magnesium 250 MG tablet Take 1 tablet by mouth Daily.   Past Week at Unknown time   • spironolactone (ALDACTONE) 25 MG tablet Take 1 tablet by mouth Daily. 90 tablet 0 Past Month at Unknown time   • zolpidem (Ambien) 5 MG tablet Take 1 tablet by mouth At Night As Needed for Sleep. 90 tablet 0 11/1/2021 at Unknown time   • ibuprofen (ADVIL,MOTRIN) 200 MG tablet Take 200 mg by mouth Daily As Needed for Mild Pain .   More than a month at Unknown time   • Loratadine 10 MG capsule Take 1 capsule by mouth daily as needed.   More than a month at Unknown time       Allergies:  Amoxicillin and Prednisone    ROS:    Pertinent items are noted in HPI     Objective     Blood pressure 141/94, pulse 78, resp. rate 16, height 160 cm (63\"), weight 58.2 kg (128 lb 4.8 oz), SpO2 100 %, not currently breastfeeding.    Physical Exam   Constitutional: Pt is oriented to person, place, and time and well-developed, well-nourished, and in no distress.   Abdominal: Soft. "   Psychiatric: Mood, memory, affect and judgment normal.     Assessment/Plan     Diagnosis:  Encounter for screening for colon cancer    Anticipated Surgical Procedure:  Colonoscopy    The risks, benefits, and alternatives of this procedure have been discussed with the patient or the responsible party- the patient understands and agrees to proceed.

## 2021-11-02 NOTE — ANESTHESIA POSTPROCEDURE EVALUATION
"Patient: Eve Khan    Procedure Summary     Date: 11/02/21 Room / Location: [facility] ENDOSCOPY 8 /  [facility]    Anesthesia Start: 1251 Anesthesia Stop: 1332    Procedure: COLONOSCOPY to cecum and TI with cold polypectomy (N/A ) Diagnosis:       Colon polyps      (Colon polyps [K63.5])    Surgeons: Lavelle Young MD Provider: Aba Richardson MD    Anesthesia Type: MAC ASA Status: 2          Anesthesia Type: MAC    Vitals  Vitals Value Taken Time   /76 11/02/21 1350   Temp     Pulse 69 11/02/21 1350   Resp 16 11/02/21 1350   SpO2 100 % 11/02/21 1350           Post Anesthesia Care and Evaluation    Patient location during evaluation: bedside  Patient participation: complete - patient participated  Level of consciousness: awake and alert  Pain management: adequate  Airway patency: patent  Anesthetic complications: No anesthetic complications    Cardiovascular status: acceptable  Respiratory status: acceptable  Hydration status: acceptable    Comments: /76 (BP Location: Left arm, Patient Position: Lying)   Pulse 69   Resp 16   Ht 160 cm (63\")   Wt 58.2 kg (128 lb 4.8 oz)   SpO2 100%   BMI 22.73 kg/m²       "

## 2021-11-03 LAB
LAB AP CASE REPORT: NORMAL
PATH REPORT.FINAL DX SPEC: NORMAL
PATH REPORT.GROSS SPEC: NORMAL

## 2021-11-05 ENCOUNTER — TELEPHONE (OUTPATIENT)
Dept: GASTROENTEROLOGY | Facility: CLINIC | Age: 71
End: 2021-11-05

## 2021-11-05 NOTE — TELEPHONE ENCOUNTER
Called pt and advised of Dr. Young's note.  Pt verb understanding.  Pt placed in 5 year c/s recall.

## 2021-11-05 NOTE — TELEPHONE ENCOUNTER
----- Message from Lavelle Young MD sent at 11/5/2021 10:28 AM EDT -----  Pathology benignColonoscopy recall 5 years

## 2022-04-11 ENCOUNTER — TELEPHONE (OUTPATIENT)
Dept: INTERNAL MEDICINE | Facility: CLINIC | Age: 72
End: 2022-04-11

## 2022-04-11 NOTE — TELEPHONE ENCOUNTER
PATIENT CALLED REGARDING A TOENAIL FUNGUS, NAIL COMING OFF,     TOE IS BRIGHT RED AND PUFFY    SHE IS LEAVING Saturday FOR A VACATION AND NEEDS SOMETHING TO HELP OUT     PLEASE CALL :   Eve Khan ([facility]) [phone] (H)       PHARMACY:   [facility] [address] [place] [address] [facility] - [phone] [facility] [phone]   [phone]    REQUESTING CALL BACK

## 2022-04-29 ENCOUNTER — OFFICE VISIT (OUTPATIENT)
Dept: INTERNAL MEDICINE | Facility: CLINIC | Age: 72
End: 2022-04-29

## 2022-04-29 VITALS
SYSTOLIC BLOOD PRESSURE: 149 MMHG | OXYGEN SATURATION: 99 % | HEIGHT: 63 IN | DIASTOLIC BLOOD PRESSURE: 90 MMHG | HEART RATE: 83 BPM | WEIGHT: 131.6 LBS | BODY MASS INDEX: 23.32 KG/M2

## 2022-04-29 DIAGNOSIS — I10 PRIMARY HYPERTENSION: ICD-10-CM

## 2022-04-29 DIAGNOSIS — F51.01 PRIMARY INSOMNIA: Primary | ICD-10-CM

## 2022-04-29 DIAGNOSIS — B35.1 FUNGAL INFECTION OF TOENAIL: ICD-10-CM

## 2022-04-29 PROCEDURE — 99214 OFFICE O/P EST MOD 30 MIN: CPT | Performed by: FAMILY MEDICINE

## 2022-04-29 PROCEDURE — 93000 ELECTROCARDIOGRAM COMPLETE: CPT | Performed by: FAMILY MEDICINE

## 2022-04-29 RX ORDER — ATENOLOL 25 MG/1
25 TABLET ORAL DAILY
Qty: 30 TABLET | Refills: 1 | Status: SHIPPED | OUTPATIENT
Start: 2022-04-29 | End: 2022-05-27 | Stop reason: SDUPTHER

## 2022-04-29 RX ORDER — LISINOPRIL 5 MG/1
5 TABLET ORAL DAILY
Qty: 30 TABLET | Refills: 1 | Status: CANCELLED | OUTPATIENT
Start: 2022-04-29

## 2022-04-29 NOTE — PROGRESS NOTES
"Chief Complaint  Nail Problem and Hypertension    Subjective          Eve Khan presents to [facility]  History of Present Illness  Patient follows up for recurrent nail fungus she had had success with some over-the-counter treatment but she cannot obtain this anymore and she like to continue treatment as she had a recent episode of toe cellulitis with the nail that was most affected by the fungus that has improved after treatment through urgent care  She is also had concern regarding recent elevated blood pressures she has no chest pain shortness of breath or increased fatigue she does have underlying anxiety and worry sometimes elevated heart rate,   her sleep is intermittently poor  No worsening fatigue  Objective   Vital Signs:   /90 (BP Location: Right arm, Patient Position: Sitting, Cuff Size: Adult)   Pulse 83   Ht 160 cm (63\")   Wt 59.7 kg (131 lb 9.6 oz)   SpO2 99%   BMI 23.31 kg/m²     Physical Exam  Vitals and nursing note reviewed.   Constitutional:       Appearance: Normal appearance. She is normal weight.   HENT:      Head: Normocephalic and atraumatic.   Neck:      Comments: No enlarged thyroid felt  Cardiovascular:      Rate and Rhythm: Normal rate and regular rhythm.      Pulses: Normal pulses.   Pulmonary:      Effort: Pulmonary effort is normal.      Breath sounds: Normal breath sounds.   Abdominal:      Tenderness: There is no right CVA tenderness or left CVA tenderness.   Musculoskeletal:      Right lower leg: No edema.      Left lower leg: No edema.   Skin:     General: Skin is warm and dry.   Neurological:      General: No focal deficit present.      Mental Status: She is alert.   Psychiatric:         Mood and Affect: Mood normal.         Behavior: Behavior normal.         Thought Content: Thought content normal.         Judgment: Judgment normal.        Result Review :     TSH    TSH 8/2/21   TSH 1.190         10/2/2021 vitamin D normal 51   "     ECG 12 Lead    Date/Time: 4/29/2022 9:04 AM  Performed by: Yifan Douglas MD  Authorized by: Yifan Douglas MD   Comparison: not compared with previous ECG   Previous ECG: no previous ECG available  Rhythm: sinus rhythm  Rate: normal  Conduction: conduction normal  ST Segments: ST segments normal  T Waves: T waves normal  QRS axis: normal    Clinical impression: normal ECG              Assessment and Plan    Diagnoses and all orders for this visit:    1. Primary insomnia (Primary)    2. Fungal infection of toenail  -     ciclopirox (PENLAC) 8 % solution; Apply  topically to the appropriate area as directed Every Night.  Dispense: 6 mL; Refill: 2    3. Primary hypertension  -     ECG 12 Lead  -     atenolol (Tenormin) 25 MG tablet; Take 1 tablet by mouth Daily.  Dispense: 30 tablet; Refill: 1  -     Comprehensive Metabolic Panel  -     Lipid Panel  -     Magnesium      BMI is within normal parameters. No follow-up required.  New problem hypertension initiate treatment         Follow Up   Return in about 1 month (around 5/29/2022), or if symptoms worsen or fail to improve, for Recheck.  Patient was given instructions and counseling regarding her condition or for health maintenance advice. Please see specific information pulled into the AVS if appropriate.

## 2022-05-03 ENCOUNTER — TELEPHONE (OUTPATIENT)
Dept: INTERNAL MEDICINE | Facility: CLINIC | Age: 72
End: 2022-05-03

## 2022-05-03 RX ORDER — FLUCONAZOLE 150 MG/1
TABLET ORAL
Qty: 2 TABLET | Refills: 1 | Status: SHIPPED | OUTPATIENT
Start: 2022-05-03 | End: 2022-09-01

## 2022-05-03 NOTE — TELEPHONE ENCOUNTER
Caller: Eve Khan    Relationship: Self    Best call back number: [phone]     What medication are you requesting: DIFLUCAN/YEAST INFECTION MEDICINE    What are your current symptoms: VAGINAL ITCHING    How long have you been experiencing symptoms: ABOUT 2 DAYS    Have you had these symptoms before:    [] Yes  [x] No    Have you been treated for these symptoms before:   [] Yes  [x] No    If a prescription is needed, what is your preferred pharmacy and phone number: [facility] [address] [place] [address] [facility] [phone] [facility] [phone]      Additional notes: PATIENT FINISHED ANTIBIOTIC PRESCRIBED AT [facility] ARE ABOUT A WEEK AGO AND THINKS SHE HAS DEVELOPED A YEAST INFECTION FROM IT. SHE WOULD LIKE SOMETHING CALLED IN TO HELP WITH THIS.    PLEASE ADVISE

## 2022-05-03 NOTE — TELEPHONE ENCOUNTER
PATIENT WANTED A CALL BACK FROM THE OFFICE TO SEE IF THIS COULD BE CALLED IN TODAY PLEASE     Eve Khan (Self) [phone] (H)

## 2022-05-27 ENCOUNTER — LAB (OUTPATIENT)
Dept: LAB | Facility: HOSPITAL | Age: 72
End: 2022-05-27

## 2022-05-27 ENCOUNTER — OFFICE VISIT (OUTPATIENT)
Dept: INTERNAL MEDICINE | Facility: CLINIC | Age: 72
End: 2022-05-27

## 2022-05-27 VITALS
OXYGEN SATURATION: 99 % | WEIGHT: 131.7 LBS | HEART RATE: 67 BPM | SYSTOLIC BLOOD PRESSURE: 120 MMHG | HEIGHT: 63 IN | BODY MASS INDEX: 23.34 KG/M2 | DIASTOLIC BLOOD PRESSURE: 70 MMHG

## 2022-05-27 DIAGNOSIS — B35.1 FUNGAL INFECTION OF TOENAIL: Primary | ICD-10-CM

## 2022-05-27 DIAGNOSIS — I10 PRIMARY HYPERTENSION: ICD-10-CM

## 2022-05-27 LAB
ALBUMIN SERPL-MCNC: 4.1 G/DL (ref 3.5–5.2)
ALBUMIN/GLOB SERPL: 2 G/DL
ALP SERPL-CCNC: 41 U/L (ref 39–117)
ALT SERPL W P-5'-P-CCNC: 26 U/L (ref 1–33)
ANION GAP SERPL CALCULATED.3IONS-SCNC: 11 MMOL/L (ref 5–15)
AST SERPL-CCNC: 25 U/L (ref 1–32)
BILIRUB SERPL-MCNC: 0.5 MG/DL (ref 0–1.2)
BUN SERPL-MCNC: 19 MG/DL (ref 8–23)
BUN/CREAT SERPL: 19.4 (ref 7–25)
CALCIUM SPEC-SCNC: 9.4 MG/DL (ref 8.6–10.5)
CHLORIDE SERPL-SCNC: 103 MMOL/L (ref 98–107)
CHOLEST SERPL-MCNC: 185 MG/DL (ref 0–200)
CO2 SERPL-SCNC: 27 MMOL/L (ref 22–29)
CREAT SERPL-MCNC: 0.98 MG/DL (ref 0.57–1)
EGFRCR SERPLBLD CKD-EPI 2021: 61.5 ML/MIN/1.73
GLOBULIN UR ELPH-MCNC: 2.1 GM/DL
GLUCOSE SERPL-MCNC: 62 MG/DL (ref 65–99)
HDLC SERPL-MCNC: 93 MG/DL (ref 40–60)
LDLC SERPL CALC-MCNC: 83 MG/DL (ref 0–100)
LDLC/HDLC SERPL: 0.89 {RATIO}
MAGNESIUM SERPL-MCNC: 2.1 MG/DL (ref 1.6–2.4)
POTASSIUM SERPL-SCNC: 4.7 MMOL/L (ref 3.5–5.2)
PROT SERPL-MCNC: 6.2 G/DL (ref 6–8.5)
SODIUM SERPL-SCNC: 141 MMOL/L (ref 136–145)
TRIGL SERPL-MCNC: 47 MG/DL (ref 0–150)
VLDLC SERPL-MCNC: 9 MG/DL (ref 5–40)

## 2022-05-27 PROCEDURE — 36415 COLL VENOUS BLD VENIPUNCTURE: CPT | Performed by: FAMILY MEDICINE

## 2022-05-27 PROCEDURE — 80053 COMPREHEN METABOLIC PANEL: CPT | Performed by: FAMILY MEDICINE

## 2022-05-27 PROCEDURE — 99213 OFFICE O/P EST LOW 20 MIN: CPT | Performed by: FAMILY MEDICINE

## 2022-05-27 PROCEDURE — 83735 ASSAY OF MAGNESIUM: CPT | Performed by: FAMILY MEDICINE

## 2022-05-27 PROCEDURE — 80061 LIPID PANEL: CPT | Performed by: FAMILY MEDICINE

## 2022-05-27 RX ORDER — PREDNISOLONE ACETATE 10 MG/ML
SUSPENSION/ DROPS OPHTHALMIC
COMMUNITY
Start: 2022-05-11 | End: 2022-09-01

## 2022-05-27 RX ORDER — ATENOLOL 25 MG/1
12.5 TABLET ORAL DAILY
Qty: 45 TABLET | Refills: 2 | Status: SHIPPED | OUTPATIENT
Start: 2022-05-27 | End: 2023-02-07

## 2022-05-27 NOTE — PROGRESS NOTES
"Chief Complaint  follow up to hypertension and follow up to toenail fungus    Subjective          Eve Khan presents to [facility]  History of Present Illness  Patient started on atenolol last visit she is feeling much better her sleep is much better she is taking half a pill daily she thinks she is getting a little bit of improvement with the [place] for her toenails  Objective   Vital Signs:  /70 (BP Location: Right arm, Patient Position: Sitting, Cuff Size: Adult)   Pulse 67   Ht 160 cm (63\")   Wt 59.7 kg (131 lb 11.2 oz)   SpO2 99%   BMI 23.33 kg/m²   BMI is within normal parameters. No other follow-up for BMI required.      Physical Exam  Vitals and nursing note reviewed.   Constitutional:       Appearance: Normal appearance.   Cardiovascular:      Rate and Rhythm: Normal rate and regular rhythm.      Pulses: Normal pulses.      Heart sounds: Normal heart sounds.   Musculoskeletal:      Right lower leg: No edema.      Left lower leg: No edema.      Comments: Dystrophic toe nails   Skin:     General: Skin is warm and dry.   Neurological:      Mental Status: She is alert.        Result Review :                 Assessment and Plan    Diagnoses and all orders for this visit:    1. Fungal infection of toenail (Primary)    2. Primary hypertension  -     atenolol (Tenormin) 25 MG tablet; Take 0.5 tablets by mouth Daily.  Dispense: 45 tablet; Refill: 2      Follow-up results of blood work for further evaluation of lipids renal status  Follow-up otherwise as needed or       Follow Up   Return if symptoms worsen or fail to improve, for Recheck, Medicare Wellness.  Patient was given instructions and counseling regarding her condition or for health maintenance advice. Please see specific information pulled into the AVS if appropriate.       "

## 2022-08-03 ENCOUNTER — TRANSCRIBE ORDERS (OUTPATIENT)
Dept: ADMINISTRATIVE | Facility: HOSPITAL | Age: 72
End: 2022-08-03

## 2022-08-03 DIAGNOSIS — Z12.31 SCREENING MAMMOGRAM FOR BREAST CANCER: Primary | ICD-10-CM

## 2022-09-01 ENCOUNTER — OFFICE VISIT (OUTPATIENT)
Dept: INTERNAL MEDICINE | Facility: CLINIC | Age: 72
End: 2022-09-01

## 2022-09-01 VITALS
OXYGEN SATURATION: 100 % | SYSTOLIC BLOOD PRESSURE: 114 MMHG | HEART RATE: 57 BPM | WEIGHT: 133.5 LBS | HEIGHT: 63 IN | BODY MASS INDEX: 23.65 KG/M2 | DIASTOLIC BLOOD PRESSURE: 80 MMHG

## 2022-09-01 DIAGNOSIS — F51.01 PRIMARY INSOMNIA: ICD-10-CM

## 2022-09-01 DIAGNOSIS — N95.2 ATROPHIC VAGINITIS: ICD-10-CM

## 2022-09-01 DIAGNOSIS — Z00.00 MEDICARE ANNUAL WELLNESS VISIT, SUBSEQUENT: ICD-10-CM

## 2022-09-01 DIAGNOSIS — I10 PRIMARY HYPERTENSION: Primary | ICD-10-CM

## 2022-09-01 PROCEDURE — G0439 PPPS, SUBSEQ VISIT: HCPCS | Performed by: FAMILY MEDICINE

## 2022-09-01 PROCEDURE — 1170F FXNL STATUS ASSESSED: CPT | Performed by: FAMILY MEDICINE

## 2022-09-01 PROCEDURE — 1159F MED LIST DOCD IN RCRD: CPT | Performed by: FAMILY MEDICINE

## 2022-09-01 PROCEDURE — 1126F AMNT PAIN NOTED NONE PRSNT: CPT | Performed by: FAMILY MEDICINE

## 2022-09-01 RX ORDER — ZOLPIDEM TARTRATE 5 MG/1
5 TABLET ORAL NIGHTLY PRN
Qty: 90 TABLET | Refills: 1 | Status: SHIPPED | OUTPATIENT
Start: 2022-09-01

## 2022-09-01 RX ORDER — ESTRADIOL 0.1 MG/G
2 CREAM VAGINAL EVERY OTHER DAY
Qty: 3 EACH | Refills: 4 | Status: SHIPPED | OUTPATIENT
Start: 2022-09-01

## 2022-09-01 NOTE — PROGRESS NOTES
The ABCs of the Annual Wellness Visit  Subsequent Medicare Wellness Visit    Chief Complaint   Patient presents with   • follow up to hypertension   • follow up to insomnia   • Medicare Wellness-subsequent      Subjective    History of Present Illness:  Eve Khan is a 71 y.o. female who presents for a Subsequent Medicare Wellness Visit.    The following portions of the patient's history were reviewed and   updated as appropriate: allergies, current medications, past family history, past medical history, past social history, past surgical history and problem list.     Compared to one year ago, the patient feels her physical   health is the same.    Compared to one year ago, the patient feels her mental   health is the same.    Recent Hospitalizations:  She was not admitted to the hospital during the last year.       Current Medical Providers:  Patient Care Team:  Yifan Douglas MD as PCP - General    Outpatient Medications Prior to Visit   Medication Sig Dispense Refill   • ALLERGY SERUM INJECTION Inject  under the skin into the appropriate area as directed 1 (One) Time. Once monthly     • atenolol (Tenormin) 25 MG tablet Take 0.5 tablets by mouth Daily. 45 tablet 2   • ciclopirox (PENLAC) 8 % solution Apply  topically to the appropriate area as directed Every Night. 6 mL 2   • ibuprofen (ADVIL,MOTRIN) 200 MG tablet Take 200 mg by mouth Daily As Needed for Mild Pain .     • Loratadine 10 MG capsule Take 1 capsule by mouth daily as needed.     • Magnesium 250 MG tablet Take 1 tablet by mouth Daily.     • estradiol (ESTRACE) 0.1 MG/GM vaginal cream Insert 2 g into the vagina Every Other Day. INSERT 2 gm APPLICATORFUL VAGINALLY every other day 3 each 4   • spironolactone (ALDACTONE) 25 MG tablet Take 1 tablet by mouth Daily. 90 tablet 0   • zolpidem (Ambien) 5 MG tablet Take 1 tablet by mouth At Night As Needed for Sleep. 90 tablet 0   • fluconazole (Diflucan) 150 MG tablet 1 now may repeat in 1 week 2 tablet  "1   • prednisoLONE acetate (PRED FORTE) 1 % ophthalmic suspension instill 1 drop by ophthalmic route QID OS x1 week, then BID OS x1 week.       No facility-administered medications prior to visit.       No opioid medication identified on active medication list. I have reviewed chart for other potential  high risk medication/s and harmful drug interactions in the elderly.          Aspirin is not on active medication list.  Aspirin use is not indicated based on review of current medical condition/s. Risk of harm outweighs potential benefits.  .    Patient Active Problem List   Diagnosis   • Acne   • Arthritis   • Primary insomnia   • Osteopenia   • Fungal infection of toenail   • Arthropathy of temporomandibular joint   • Adenomatous polyp of colon   • Abdominal bruit   • Central serous chorioretinopathy of right eye   • Medicare annual wellness visit, subsequent   • Benign paroxysmal positional vertigo due to bilateral vestibular disorder   • Localized edema   • Menopausal vaginal dryness   • Menopausal state   • Hordeolum externum of left upper eyelid   • Health care maintenance   • Colon polyps   • Other fatigue   • Primary hypertension     Advance Care Planning   Advance Directive is not on file.Pt will supply copy          Objective       Vitals:    09/01/22 1010   BP: 114/80   BP Location: Left arm   Patient Position: Sitting   Cuff Size: Adult   Pulse: 57   SpO2: 100%   Weight: 60.6 kg (133 lb 8 oz)   Height: 160 cm (63\")   PainSc: 0-No pain     BMI Readings from Last 1 Encounters:   09/01/22 23.65 kg/m²   BMI is within normal parameters. No follow-up required.    Does the patient have evidence of cognitive impairment? No    Physical Exam            HEALTH RISK ASSESSMENT    Smoking Status:  Social History     Tobacco Use   Smoking Status Never Smoker   Smokeless Tobacco Never Used     Alcohol Consumption:  Social History     Substance and Sexual Activity   Alcohol Use Yes    Comment: MODERATELY, 1 glass of " wine daily     Fall Risk Screen:    ADAM Fall Risk Assessment was completed, and patient is at LOW risk for falls.Assessment completed on:9/1/2022    Depression Screening:  PHQ-2/PHQ-9 Depression Screening 9/1/2022   Retired PHQ-9 Total Score -   Retired Total Score -   Little Interest or Pleasure in Doing Things 0-->not at all   Feeling Down, Depressed or Hopeless 0-->not at all   PHQ-9: Brief Depression Severity Measure Score 0       Health Habits and Functional and Cognitive Screening:  Functional & Cognitive Status 9/1/2022   Do you have difficulty preparing food and eating? No   Do you have difficulty bathing yourself, getting dressed or grooming yourself? No   Do you have difficulty using the toilet? No   Do you have difficulty moving around from place to place? No   Do you have trouble with steps or getting out of a bed or a chair? No   Current Diet Well Balanced Diet   Dental Exam Up to date   Eye Exam Up to date   Exercise (times per week) 7 times per week   Current Exercises Include Walking   Current Exercise Activities Include -   Do you need help using the phone?  No   Are you deaf or do you have serious difficulty hearing?  No   Do you need help with transportation? No   Do you need help shopping? No   Do you need help preparing meals?  No   Do you need help with housework?  No   Do you need help with laundry? No   Do you need help taking your medications? No   Do you need help managing money? No   Do you ever drive or ride in a car without wearing a seat belt? No   Have you felt unusual stress, anger or loneliness in the last month? Yes   Who do you live with? Spouse   If you need help, do you have trouble finding someone available to you? No   Have you been bothered in the last four weeks by sexual problems? No   Do you have difficulty concentrating, remembering or making decisions? No       Age-appropriate Screening Schedule:  Refer to the list below for future screening recommendations based on  patient's age, sex and/or medical conditions. Orders for these recommended tests are listed in the plan section. The patient has been provided with a written plan.    Health Maintenance   Topic Date Due   • INFLUENZA VACCINE  10/01/2022   • MAMMOGRAM  09/25/2023   • DXA SCAN  09/25/2023   • TDAP/TD VACCINES (2 - Td or Tdap) 06/15/2025   • ZOSTER VACCINE  Discontinued              Assessment & Plan     CMS Preventative Services Quick Reference  Risk Factors Identified During Encounter  None Identified  The above risks/problems have been discussed with the patient.  Follow up actions/plans if indicated are seen below in the Assessment/Plan Section.  Pertinent information has been shared with the patient in the After Visit Summary.    Diagnoses and all orders for this visit:    1. Primary hypertension (Primary)    2. Primary insomnia  -     zolpidem (Ambien) 5 MG tablet; Take 1 tablet by mouth At Night As Needed for Sleep.  Dispense: 90 tablet; Refill: 1    3. Medicare annual wellness visit, subsequent    4. Atrophic vaginitis  -     estradiol (ESTRACE) 0.1 MG/GM vaginal cream; Insert 2 g into the vagina Every Other Day. INSERT 2 gm APPLICATORFUL VAGINALLY every other day  Dispense: 3 each; Refill: 4        Follow Up:   Return in about 6 months (around 3/1/2023), or if symptoms worsen or fail to improve, for Recheck.     An After Visit Summary and PPPS were given to the patient.  Ongoing  management of chronic medical problems.

## 2022-09-28 ENCOUNTER — HOSPITAL ENCOUNTER (OUTPATIENT)
Dept: MAMMOGRAPHY | Facility: HOSPITAL | Age: 72
Discharge: HOME | End: 2022-09-28
Admitting: FAMILY MEDICINE

## 2022-09-28 DIAGNOSIS — Z12.31 SCREENING MAMMOGRAM FOR BREAST CANCER: ICD-10-CM

## 2022-09-28 PROCEDURE — 77063 BREAST TOMOSYNTHESIS BI: CPT

## 2022-09-28 PROCEDURE — 77067 SCR MAMMO BI INCL CAD: CPT

## 2022-12-15 ENCOUNTER — TELEPHONE (OUTPATIENT)
Dept: INTERNAL MEDICINE | Facility: CLINIC | Age: 72
End: 2022-12-15

## 2022-12-15 NOTE — TELEPHONE ENCOUNTER
Patient notifified and expressed understanding.  She stated that she is going to wait and see how she feels tomorrow since she is feeling better. If she is feeling worse then she will go to the Urgent Care.

## 2022-12-15 NOTE — TELEPHONE ENCOUNTER
Caller: Eve Khan    Relationship to patient: Self    Best call back number: [phone]    COVID19 symptoms: FEVER, CHILLS, BODY ACHES, HARD TIME KEEPING EYES OPEN, COUGH     Date of initial quarantine: 12/11/2022    Additional information or concerns: PATIENT TOOK TWO DIFFERENT TESTS THIS MORNING AND BOTH CAME UP NEGATIVE. PATIENT STATED THAT HER  HAS TESTED POSITIVE FOR COVID ON 12/11/2022. PATIENT IS WANTING TO KNOW WHAT SHE SHOULD DO. PLEASE ADVISE.    What is the patients preferred pharmacy:     [address] [address] [facility] [phone] [facility] [phone]

## 2023-02-06 DIAGNOSIS — I10 PRIMARY HYPERTENSION: ICD-10-CM

## 2023-02-07 RX ORDER — ATENOLOL 25 MG/1
TABLET ORAL
Qty: 45 TABLET | Refills: 0 | Status: SHIPPED | OUTPATIENT
Start: 2023-02-07

## 2023-04-20 ENCOUNTER — HOSPITAL ENCOUNTER (OUTPATIENT)
Dept: GENERAL RADIOLOGY | Facility: HOSPITAL | Age: 73
Discharge: HOME | End: 2023-04-20
Payer: MEDICARE

## 2023-04-20 ENCOUNTER — OFFICE VISIT (OUTPATIENT)
Dept: INTERNAL MEDICINE | Facility: CLINIC | Age: 73
End: 2023-04-20
Payer: MEDICARE

## 2023-04-20 VITALS
WEIGHT: 136.8 LBS | BODY MASS INDEX: 24.24 KG/M2 | OXYGEN SATURATION: 99 % | DIASTOLIC BLOOD PRESSURE: 66 MMHG | HEART RATE: 61 BPM | HEIGHT: 63 IN | SYSTOLIC BLOOD PRESSURE: 104 MMHG

## 2023-04-20 DIAGNOSIS — R91.1 LUNG NODULE SEEN ON IMAGING STUDY: Primary | ICD-10-CM

## 2023-04-20 DIAGNOSIS — G89.29 CHRONIC RIGHT SHOULDER PAIN: Primary | ICD-10-CM

## 2023-04-20 DIAGNOSIS — M25.511 CHRONIC RIGHT SHOULDER PAIN: Primary | ICD-10-CM

## 2023-04-20 PROCEDURE — 99213 OFFICE O/P EST LOW 20 MIN: CPT | Performed by: FAMILY MEDICINE

## 2023-04-20 PROCEDURE — 3074F SYST BP LT 130 MM HG: CPT | Performed by: FAMILY MEDICINE

## 2023-04-20 PROCEDURE — 73030 X-RAY EXAM OF SHOULDER: CPT

## 2023-04-20 PROCEDURE — 3078F DIAST BP <80 MM HG: CPT | Performed by: FAMILY MEDICINE

## 2023-04-20 RX ORDER — CELECOXIB 200 MG/1
200 CAPSULE ORAL DAILY
Qty: 30 CAPSULE | Refills: 1 | Status: SHIPPED | OUTPATIENT
Start: 2023-04-20

## 2023-04-20 NOTE — PROGRESS NOTES
"Chief Complaint  Shoulder Pain    Subjective        Eve Khan presents to [facility]  History of Present Illness  Patient appointment for chronic right shoulder pain she does not member anything acute trauma however she was driving a car with friends and reaching behind CT gram per she felt some stretching and pulling she has been favoring her shoulder ever since this happened July 2022  She is cautious when she uses her shoulder and she has not been using to full range of motion because of pain  Objective   Vital Signs:  /66 (BP Location: Left arm, Patient Position: Sitting, Cuff Size: Adult)   Pulse 61   Ht 160 cm (63\")   Wt 62.1 kg (136 lb 12.8 oz)   SpO2 99%   BMI 24.23 kg/m²   Estimated body mass index is 24.23 kg/m² as calculated from the following:    Height as of this encounter: 160 cm (63\").    Weight as of this encounter: 62.1 kg (136 lb 12.8 oz).       BMI is within normal parameters. No other follow-up for BMI required.      Physical Exam  Vitals reviewed.   Constitutional:       Appearance: Normal appearance.   Musculoskeletal:      Right shoulder: No swelling, deformity, tenderness, bony tenderness or crepitus. Decreased range of motion. Normal strength. Normal pulse.   Neurological:      Mental Status: She is alert.        Result Review :                   Assessment and Plan   Diagnoses and all orders for this visit:    1. Chronic right shoulder pain (Primary)  -     XR Shoulder 2+ View Right    Other orders  -     celecoxib (CeleBREX) 200 MG capsule; Take 1 capsule by mouth Daily.  Dispense: 30 capsule; Refill: 1    Suspects capsulitis  Recommend range of motion exercises  Do not take any ibuprofen or Aleve         Follow Up   Return if symptoms worsen or fail to improve, for Recheck.  Patient was given instructions and counseling regarding her condition or for health maintenance advice. Please see specific information pulled into the AVS if " appropriate.       Answers for HPI/ROS submitted by the patient on 4/19/2023  Please describe your symptoms.: Pain and weakness in my right shoulder when I reach out or behind me in a certain way.  Sometimes it hurts on my upper arm as well and can be uncomfortable in bed at night.  Have you had these symptoms before?: Yes  How long have you been having these symptoms?: Greater than 2 weeks  Please list any medications you are currently taking for this condition.: For the last month I have been taking one Ibuprofen at bedtime.  Please describe any probable cause for these symptoms. : Last summer I tried to hand a heavy purse out the open car door to someone to my right and behind me. That hurt at the time, and I have been favoring my right arm ever since.  What is the primary reason for your visit?: Other

## 2023-05-11 DIAGNOSIS — I10 PRIMARY HYPERTENSION: ICD-10-CM

## 2023-05-12 RX ORDER — ATENOLOL 25 MG/1
TABLET ORAL
Qty: 45 TABLET | Refills: 1 | Status: SHIPPED | OUTPATIENT
Start: 2023-05-12

## 2023-06-19 ENCOUNTER — OFFICE VISIT (OUTPATIENT)
Dept: INTERNAL MEDICINE | Facility: CLINIC | Age: 73
End: 2023-06-19
Payer: MEDICARE

## 2023-06-19 VITALS
HEIGHT: 63 IN | SYSTOLIC BLOOD PRESSURE: 126 MMHG | HEART RATE: 57 BPM | OXYGEN SATURATION: 99 % | WEIGHT: 133 LBS | DIASTOLIC BLOOD PRESSURE: 78 MMHG | BODY MASS INDEX: 23.57 KG/M2

## 2023-06-19 DIAGNOSIS — S40.262A TICK BITE OF LEFT SHOULDER, INITIAL ENCOUNTER: Primary | ICD-10-CM
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
## 2023-06-19 NOTE — PROGRESS NOTES
"Chief Complaint  Insect Bite (Tick bite not healing right shoulder )    Subjective        Eve Khan presents to [facility]  History of Present Illness  Patient with tick bite she pulled it off when she felted she thinks it has some prolonged healing there is still some red nodule she would like evaluated left posterior shoulder  Objective   Vital Signs:  /78   Pulse 57   Ht 160 cm (62.99\")   Wt 60.3 kg (133 lb)   SpO2 99%   BMI 23.57 kg/m²   Estimated body mass index is 23.57 kg/m² as calculated from the following:    Height as of this encounter: 160 cm (62.99\").    Weight as of this encounter: 60.3 kg (133 lb).       BMI is within normal parameters. No other follow-up for BMI required.      Physical Exam  Vitals and nursing note reviewed.   Constitutional:       Appearance: Normal appearance.   Skin:     Comments: Left shoulder papule 2 to 3 mm nontender no parts seen after minimal epidermal deroofing   Neurological:      Mental Status: She is alert.      Result Review :                   Assessment and Plan   Diagnoses and all orders for this visit:    1. Tick bite of left shoulder, initial encounter (Primary)    Symptomatic treatment bacitracin ointment daily for a week         Follow Up   Return if symptoms worsen or fail to improve, for Recheck.  Patient was given instructions and counseling regarding her condition or for health maintenance advice. Please see specific information pulled into the AVS if appropriate.         "

## 2023-08-14 ENCOUNTER — TRANSCRIBE ORDERS (OUTPATIENT)
Dept: ADMINISTRATIVE | Facility: HOSPITAL | Age: 73
End: 2023-08-14
Payer: MEDICARE

## 2023-08-14 DIAGNOSIS — Z12.31 SCREENING MAMMOGRAM FOR BREAST CANCER: Primary | ICD-10-CM

## 2023-09-06 ENCOUNTER — OFFICE VISIT (OUTPATIENT)
Dept: INTERNAL MEDICINE | Facility: CLINIC | Age: 73
End: 2023-09-06
Payer: MEDICARE

## 2023-09-06 VITALS
WEIGHT: 129.1 LBS | HEART RATE: 56 BPM | TEMPERATURE: 97.1 F | BODY MASS INDEX: 22.88 KG/M2 | SYSTOLIC BLOOD PRESSURE: 118 MMHG | DIASTOLIC BLOOD PRESSURE: 74 MMHG | HEIGHT: 63 IN | OXYGEN SATURATION: 98 %

## 2023-09-06 DIAGNOSIS — Z00.00 MEDICARE ANNUAL WELLNESS VISIT, SUBSEQUENT: ICD-10-CM

## 2023-09-06 DIAGNOSIS — H91.8X1 OTHER HEARING LOSS OF RIGHT EAR WITH UNRESTRICTED HEARING OF LEFT EAR: ICD-10-CM

## 2023-09-06 DIAGNOSIS — F51.01 PRIMARY INSOMNIA: ICD-10-CM

## 2023-09-06 DIAGNOSIS — N95.2 ATROPHIC VAGINITIS: ICD-10-CM

## 2023-09-06 DIAGNOSIS — I10 PRIMARY HYPERTENSION: ICD-10-CM

## 2023-09-06 DIAGNOSIS — M85.852 OSTEOPENIA OF BOTH HIPS: ICD-10-CM

## 2023-09-06 DIAGNOSIS — M85.851 OSTEOPENIA OF BOTH HIPS: ICD-10-CM

## 2023-09-06 DIAGNOSIS — Z00.00 HEALTH CARE MAINTENANCE: Primary | ICD-10-CM

## 2023-09-06 PROBLEM — S40.262A TICK BITE OF LEFT SHOULDER: Status: RESOLVED | Noted: 2023-06-19 | Resolved: 2023-09-06

## 2023-09-06 PROBLEM — IMO0001 IMPAIRED HEARING: Status: ACTIVE | Noted: 2023-09-06

## 2023-09-06 PROBLEM — H00.014 HORDEOLUM EXTERNUM OF LEFT UPPER EYELID: Status: RESOLVED | Noted: 2020-01-03 | Resolved: 2023-09-06
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
## 2023-09-06 PROBLEM — H91.90 IMPAIRED HEARING: Status: ACTIVE | Noted: 2023-09-06

## 2023-09-06 PROBLEM — R60.0 LOCALIZED EDEMA: Status: RESOLVED | Noted: 2018-06-29 | Resolved: 2023-09-06

## 2023-09-06 RX ORDER — ATENOLOL 25 MG/1
TABLET ORAL
Qty: 45 TABLET | Refills: 3 | Status: CANCELLED | OUTPATIENT
Start: 2023-09-06

## 2023-09-06 RX ORDER — ESTRADIOL 0.1 MG/G
2 CREAM VAGINAL EVERY OTHER DAY
Qty: 3 EACH | Refills: 4 | Status: SHIPPED | OUTPATIENT
Start: 2023-09-06

## 2023-09-06 RX ORDER — ATENOLOL 25 MG/1
12.5 TABLET ORAL DAILY
Qty: 45 TABLET | Refills: 3 | Status: SHIPPED | OUTPATIENT
Start: 2023-09-06

## 2023-09-06 RX ORDER — TRIAMCINOLONE ACETONIDE 55 UG/1
2 SPRAY, METERED NASAL DAILY
COMMUNITY

## 2023-09-06 RX ORDER — ZOLPIDEM TARTRATE 5 MG/1
5 TABLET ORAL NIGHTLY PRN
Qty: 90 TABLET | Refills: 1 | Status: SHIPPED | OUTPATIENT
Start: 2023-09-06

## 2023-09-06 NOTE — PROGRESS NOTES
"Chief Complaint  Annual Exam    Subjective        Eve Khan presents to [facility]  History of Present Illness  Hearing loss right ear with background long-term tinnitus  She has no headache chronic rhinitis and environmental upper respiratory allergies symptoms  Objective   Vital Signs:  /74   Pulse 56   Temp 97.1 °F (36.2 °C)   Ht 160 cm (62.99\")   Wt 58.6 kg (129 lb 1.6 oz)   SpO2 98%   BMI 22.87 kg/m²   Estimated body mass index is 22.87 kg/m² as calculated from the following:    Height as of this encounter: 160 cm (62.99\").    Weight as of this encounter: 58.6 kg (129 lb 1.6 oz).       BMI is within normal parameters. No other follow-up for BMI required.      Physical Exam  Vitals and nursing note reviewed.   Constitutional:       Appearance: Normal appearance.   HENT:      Right Ear: Tympanic membrane, ear canal and external ear normal.      Left Ear: Tympanic membrane, ear canal and external ear normal.   Cardiovascular:      Rate and Rhythm: Normal rate and regular rhythm.      Pulses: Normal pulses.      Heart sounds: Normal heart sounds.   Pulmonary:      Effort: Pulmonary effort is normal.      Breath sounds: Normal breath sounds.   Neurological:      Mental Status: She is alert.      Result Review :                   Assessment and Plan   Diagnoses and all orders for this visit:      1. Other hearing loss of right ear with unrestricted hearing of left ear    Continue nasal steroid and antihistamine  Consult ENT       Follow Up   Return in about 1 year (around 9/6/2024), or if symptoms worsen or fail to improve, for Recheck, Medicare Wellness.  Patient was given instructions and counseling regarding her condition or for health maintenance advice. Please see specific information pulled into the AVS if appropriate.         "

## 2023-09-06 NOTE — PROGRESS NOTES
The ABCs of the Annual Wellness Visit  Subsequent Medicare Wellness Visit    Chief Complaint   Patient presents with    Medicare Wellness-subsequent      Subjective    History of Present Illness:  Eve Khan is a 72 y.o. female who presents for a Subsequent Medicare Wellness Visit.    The following portions of the patient's history were reviewed and   updated as appropriate: allergies, current medications, past family history, past medical history, past social history, past surgical history, and problem list.     Compared to one year ago, the patient feels her physical   health is the same.    Compared to one year ago, the patient feels her mental   health is the same.    Recent Hospitalizations:  She was not admitted to the hospital during the last year.       Current Medical Providers:  Patient Care Team:  Yifan Douglas MD as PCP - General    Outpatient Medications Prior to Visit   Medication Sig Dispense Refill    ALLERGY SERUM INJECTION Inject  under the skin into the appropriate area as directed 1 (One) Time. Once monthly      ibuprofen (ADVIL,MOTRIN) 200 MG tablet Take 1 tablet by mouth Daily As Needed for Mild Pain.      Loratadine 10 MG capsule Take 1 capsule by mouth Daily As Needed.      Magnesium 250 MG tablet Take 1 tablet by mouth Daily.      Triamcinolone Acetonide (NASACORT) 55 MCG/ACT nasal inhaler 2 sprays into the nostril(s) as directed by provider Daily.      atenolol (TENORMIN) 25 MG tablet TAKE ONE-HALF (1/2) TABLET DAILY (NEEDS TO BE SEEN BEFORE THE NEXT REFILL) 45 tablet 1    estradiol (ESTRACE) 0.1 MG/GM vaginal cream Insert 2 g into the vagina Every Other Day. INSERT 2 gm APPLICATORFUL VAGINALLY every other day 3 each 4    zolpidem (Ambien) 5 MG tablet Take 1 tablet by mouth At Night As Needed for Sleep. 90 tablet 1    celecoxib (CeleBREX) 200 MG capsule Take 1 capsule by mouth Daily. 30 capsule 1    ciclopirox (PENLAC) 8 % solution Apply  topically to the appropriate area as  "directed Every Night. 6 mL 2     No facility-administered medications prior to visit.       No opioid medication identified on active medication list. I have reviewed chart for other potential  high risk medication/s and harmful drug interactions in the elderly.        Aspirin is not on active medication list.  Aspirin use is not indicated based on review of current medical condition/s. Risk of harm outweighs potential benefits.  .    Patient Active Problem List   Diagnosis    Acne    Arthritis    Primary insomnia    Osteopenia of both hips    Fungal infection of toenail    Arthropathy of temporomandibular joint    Adenomatous polyp of colon    Abdominal bruit    Central serous chorioretinopathy of right eye    Medicare annual wellness visit, subsequent    Benign paroxysmal positional vertigo due to bilateral vestibular disorder    Menopausal vaginal dryness    Menopausal state    Health care maintenance    Colon polyps    Other fatigue    Primary hypertension    Atrophic vaginitis    Impaired hearing     Advance Care Planning   Advance Directive is not on file.  ACP discussion was held with the patient during this visit. Patient has an advance directive (not in EMR), copy requested.          Objective       Vitals:    09/06/23 1120   BP: 118/74   Pulse: 56   Temp: 97.1 °F (36.2 °C)   SpO2: 98%   Weight: 58.6 kg (129 lb 1.6 oz)   Height: 160 cm (62.99\")     BMI Readings from Last 1 Encounters:   09/06/23 22.87 kg/m²   BMI is within normal parameters. No follow-up required.    Does the patient have evidence of cognitive impairment? No    Physical Exam            HEALTH RISK ASSESSMENT    Smoking Status:  Social History     Tobacco Use   Smoking Status Never   Smokeless Tobacco Never     Alcohol Consumption:  Social History     Substance and Sexual Activity   Alcohol Use Yes    Alcohol/week: 2.0 standard drinks    Types: 2 Glasses of wine per week    Comment: One glass of wine once or twice a week.     Fall " Risk Screen:    STEADI Fall Risk Assessment was completed, and patient is at LOW risk for falls.Assessment completed on:2023    Depression Screenin/6/2023    11:25 AM   PHQ-2/PHQ-9 Depression Screening   Little Interest or Pleasure in Doing Things 0-->not at all   Feeling Down, Depressed or Hopeless 0-->not at all   PHQ-9: Brief Depression Severity Measure Score 0       Health Habits and Functional and Cognitive Screenin/6/2023    11:24 AM   Functional & Cognitive Status   Do you have difficulty preparing food and eating? No   Do you have difficulty bathing yourself, getting dressed or grooming yourself? No   Do you have difficulty using the toilet? No   Do you have difficulty moving around from place to place? No   Do you have trouble with steps or getting out of a bed or a chair? No   Current Diet Well Balanced Diet   Dental Exam Up to date   Eye Exam Up to date   Exercise (times per week) 6 times per week   Current Exercises Include Walking   Do you need help using the phone?  No   Are you deaf or do you have serious difficulty hearing?  Yes   Do you need help to go to places out of walking distance? No   Do you need help shopping? No   Do you need help preparing meals?  No   Do you need help with housework?  No   Do you need help with laundry? No   Do you need help taking your medications? No   Do you need help managing money? No   Do you ever drive or ride in a car without wearing a seat belt? No   Have you felt unusual stress, anger or loneliness in the last month? No   Who do you live with? Spouse   If you need help, do you have trouble finding someone available to you? No   Have you been bothered in the last four weeks by sexual problems? No   Do you have difficulty concentrating, remembering or making decisions? No       Age-appropriate Screening Schedule:  Refer to the list below for future screening recommendations based on patient's age, sex and/or medical conditions. Orders for  these recommended tests are listed in the plan section. The patient has been provided with a written plan.    Health Maintenance   Topic Date Due    COVID-19 Vaccine (6 - Moderna series) 01/28/2023    ANNUAL WELLNESS VISIT  09/01/2023    DXA SCAN  09/25/2023    INFLUENZA VACCINE  10/01/2023    MAMMOGRAM  09/28/2024    TDAP/TD VACCINES (2 - Td or Tdap) 06/15/2025    COLORECTAL CANCER SCREENING  11/02/2026    HEPATITIS C SCREENING  Completed    Pneumococcal Vaccine 65+  Completed    ZOSTER VACCINE  Discontinued              Assessment & Plan     CMS Preventative Services Quick Reference  Risk Factors Identified During Encounter  Hearing Problem: Referral to ENT ordered  The above risks/problems have been discussed with the patient.  Follow up actions/plans if indicated are seen below in the Assessment/Plan Section.  Pertinent information has been shared with the patient in the After Visit Summary.    Diagnoses and all orders for this visit:        1. Medicare annual wellness visit, subsequent    2. Primary insomnia  -     zolpidem (Ambien) 5 MG tablet; Take 1 tablet by mouth At Night As Needed for Sleep.  Dispense: 90 tablet; Refill: 1    3. Primary hypertension  -     atenolol (TENORMIN) 25 MG tablet; Take 0.5 tablets by mouth Daily.  Dispense: 45 tablet; Refill: 3    4. Atrophic vaginitis  -     estradiol (ESTRACE) 0.1 MG/GM vaginal cream; Insert 2 g into the vagina Every Other Day. INSERT 2 gm APPLICATORFUL VAGINALLY every other day  Dispense: 3 each; Refill: 4    5. Osteopenia of both hips  -     DEXA Bone Density Axial; Future        Follow Up:   Return in about 1 year (around 9/6/2024), or if symptoms worsen or fail to improve, for Recheck, Medicare Wellness.     An After Visit Summary and PPPS were given to the patient.  Ongoing  management of chronic medical problems.

## 2023-09-06 NOTE — PROGRESS NOTES
Subjective   Eve Khan is a 72 y.o. female.     Chief Complaint   Patient presents with    Annual Exam         History of Present Illness   Eve Khan 72 y.o. female who presents for an Annual Wellness Visit.  she has a history of   Patient Active Problem List   Diagnosis    Acne    Arthritis    Primary insomnia    Osteopenia of both hips    Fungal infection of toenail    Arthropathy of temporomandibular joint    Adenomatous polyp of colon    Abdominal bruit    Central serous chorioretinopathy of right eye    Medicare annual wellness visit, subsequent    Benign paroxysmal positional vertigo due to bilateral vestibular disorder    Menopausal vaginal dryness    Menopausal state    Health care maintenance    Colon polyps    Other fatigue    Primary hypertension    Atrophic vaginitis    Impaired hearing   .  she has been feeling well.   I  reviewed health maintenance with her as part of my preventative care plan.  She has regular dental and eye exams  The following portions of the patient's history were reviewed and updated as appropriate: allergies, current medications, past family history, past medical history, past social history, past surgical history, and problem list.    Review of Systems   Constitutional:  Negative for appetite change, fever and unexpected weight change.   HENT:  Positive for hearing loss. Negative for ear pain, facial swelling and sore throat.    Eyes:  Negative for pain and visual disturbance.   Respiratory:  Negative for chest tightness, shortness of breath and wheezing.    Cardiovascular:  Negative for chest pain and palpitations.   Gastrointestinal:  Negative for abdominal pain and blood in stool.   Endocrine: Negative.    Genitourinary:  Negative for difficulty urinating and hematuria.   Musculoskeletal:  Negative for joint swelling.   Neurological:  Negative for tremors, seizures and syncope.   Hematological:  Negative for adenopathy.   Psychiatric/Behavioral: Negative.        Objective   Physical Exam  Vitals and nursing note reviewed.   Constitutional:       Appearance: Normal appearance. She is well-developed. She is not diaphoretic.   HENT:      Head: Normocephalic and atraumatic.      Right Ear: Tympanic membrane, ear canal and external ear normal.      Left Ear: Tympanic membrane, ear canal and external ear normal.   Eyes:      General: Lids are normal. No scleral icterus.     Extraocular Movements: Extraocular movements intact.      Conjunctiva/sclera: Conjunctivae normal.   Neck:      Thyroid: No thyroid mass or thyromegaly.      Vascular: No carotid bruit or JVD.   Cardiovascular:      Rate and Rhythm: Normal rate and regular rhythm.      Pulses: Normal pulses.           Radial pulses are 2+ on the right side and 2+ on the left side.      Heart sounds: Normal heart sounds. No murmur heard.  Pulmonary:      Effort: Pulmonary effort is normal. No respiratory distress.      Breath sounds: Normal breath sounds.   Abdominal:      Palpations: Abdomen is soft.      Tenderness: There is no right CVA tenderness or left CVA tenderness.   Musculoskeletal:      Cervical back: Normal range of motion.      Right lower leg: No edema.      Left lower leg: No edema.   Skin:     General: Skin is warm and dry.      Coloration: Skin is not pale.      Findings: No erythema or rash.   Neurological:      General: No focal deficit present.      Mental Status: She is alert and oriented to person, place, and time.      Sensory: No sensory deficit.      Deep Tendon Reflexes: Reflexes are normal and symmetric.   Psychiatric:         Mood and Affect: Mood normal.         Behavior: Behavior normal. Behavior is cooperative.         Thought Content: Thought content normal.         Judgment: Judgment normal.       Assessment & Plan   Diagnoses and all orders for this visit:    1. Health care maintenance (Primary)      Continue healthy lifestyle calorie appropriate diet and regular physical activity with  decreased alcohol   Education provided regarding prevention of osteoporosis development with strength thinning weightbearing exercises  Education provided regarding prevention of serious illness with immunizations she is presently current  Follow-up ongoing management of chronic problems otherwise preventatively annually

## 2023-10-02 ENCOUNTER — HOSPITAL ENCOUNTER (OUTPATIENT)
Dept: MAMMOGRAPHY | Facility: HOSPITAL | Age: 73
Discharge: HOME | End: 2023-10-02
Admitting: FAMILY MEDICINE
Payer: MEDICARE

## 2023-10-02 DIAGNOSIS — Z12.31 SCREENING MAMMOGRAM FOR BREAST CANCER: ICD-10-CM

## 2023-10-02 PROCEDURE — 77067 SCR MAMMO BI INCL CAD: CPT

## 2023-10-02 PROCEDURE — 77063 BREAST TOMOSYNTHESIS BI: CPT

## 2023-11-01 ENCOUNTER — TELEPHONE (OUTPATIENT)
Dept: INTERNAL MEDICINE | Facility: CLINIC | Age: 73
End: 2023-11-01
Payer: MEDICARE

## 2023-11-01 NOTE — TELEPHONE ENCOUNTER
Caller: Eve Khan    Relationship: Self    Best call back number: [phone]    What is the best time to reach you: ANYTIME     Who are you requesting to speak with (clinical staff, provider,  specific staff member): CLINICAL STAFF     Do you know the name of the person who called: SELF     What was the call regarding: PATIENT CALLED AND STATED SHE RECENTLY HAD A MAMMOGRAM THAT WILL NEED FURTHER TESTING DUE TO DENSE BREAST. PLEASE CALL     Is it okay if the provider responds through MyChart: NO

## 2023-11-02 DIAGNOSIS — I10 PRIMARY HYPERTENSION: ICD-10-CM

## 2023-11-02 RX ORDER — ATENOLOL 25 MG/1
TABLET ORAL
Qty: 45 TABLET | Refills: 3 | Status: SHIPPED | OUTPATIENT
Start: 2023-11-02

## 2023-11-06 DIAGNOSIS — R92.333 HETEROGENEOUSLY DENSE TISSUE OF BOTH BREASTS ON MAMMOGRAPHY: ICD-10-CM

## 2023-11-06 DIAGNOSIS — R92.8 ABNORMAL MAMMOGRAM: Primary | ICD-10-CM

## 2023-11-06 DIAGNOSIS — R92.30 DENSE BREAST TISSUE: ICD-10-CM

## 2023-11-06 DIAGNOSIS — R92.30 DENSE BREAST TISSUE ON MAMMOGRAM, UNSPECIFIED TYPE: ICD-10-CM

## 2023-12-27 ENCOUNTER — HOSPITAL ENCOUNTER (OUTPATIENT)
Dept: BONE DENSITY | Facility: HOSPITAL | Age: 73
Discharge: HOME | End: 2023-12-27
Admitting: FAMILY MEDICINE
Payer: MEDICARE

## 2023-12-27 DIAGNOSIS — M85.851 OSTEOPENIA OF BOTH HIPS: ICD-10-CM

## 2023-12-27 DIAGNOSIS — M85.852 OSTEOPENIA OF BOTH HIPS: ICD-10-CM

## 2023-12-27 PROCEDURE — 77080 DXA BONE DENSITY AXIAL: CPT

## 2023-12-29 ENCOUNTER — TELEPHONE (OUTPATIENT)
Dept: INTERNAL MEDICINE | Facility: CLINIC | Age: 73
End: 2023-12-29
Payer: MEDICARE

## 2023-12-29 NOTE — TELEPHONE ENCOUNTER
I advised this patient to go to the ER as she believes her heart is fluttering. She said her  wasn't home and she would go when he got home.    Written for documentation.

## 2023-12-29 NOTE — TELEPHONE ENCOUNTER
Caller: Eve Khan    Relationship: Self    Best call back number: [phone]    What was the call regarding: HUB WAS ADVISED TO PLACE MESSAGE. PATIENT CALLED WANTING TO SET UP AN APPOINTMENT TODAY FOR HER BLOOD PRESSURE. PATIENT STATES THAT LAST NIGHT AT MIDNIGHT IT /78. THIS WAS AFTER TAKING HER SECOND HALF OF ATENOLOL. PLEASE ADVISE PATIENT ASAP.

## 2024-01-08 ENCOUNTER — OFFICE VISIT (OUTPATIENT)
Dept: INTERNAL MEDICINE | Facility: CLINIC | Age: 74
End: 2024-01-08
Payer: MEDICARE

## 2024-01-08 VITALS
WEIGHT: 128.1 LBS | DIASTOLIC BLOOD PRESSURE: 74 MMHG | HEIGHT: 63 IN | HEART RATE: 65 BPM | OXYGEN SATURATION: 100 % | SYSTOLIC BLOOD PRESSURE: 110 MMHG | TEMPERATURE: 97.5 F | BODY MASS INDEX: 22.7 KG/M2

## 2024-01-08 DIAGNOSIS — I10 PRIMARY HYPERTENSION: ICD-10-CM

## 2024-01-08 DIAGNOSIS — R00.2 PALPITATIONS: Primary | ICD-10-CM

## 2024-01-08 DIAGNOSIS — F41.9 ANXIETY: ICD-10-CM

## 2024-01-08 DIAGNOSIS — N95.2 ATROPHIC VAGINITIS: ICD-10-CM

## 2024-01-08 PROCEDURE — 3078F DIAST BP <80 MM HG: CPT | Performed by: FAMILY MEDICINE

## 2024-01-08 PROCEDURE — 3074F SYST BP LT 130 MM HG: CPT | Performed by: FAMILY MEDICINE

## 2024-01-08 PROCEDURE — 99214 OFFICE O/P EST MOD 30 MIN: CPT | Performed by: FAMILY MEDICINE

## 2024-01-08 RX ORDER — SERTRALINE HYDROCHLORIDE 25 MG/1
25 TABLET, FILM COATED ORAL DAILY
Qty: 30 TABLET | Refills: 1 | Status: SHIPPED | OUTPATIENT
Start: 2024-01-08

## 2024-01-08 NOTE — PROGRESS NOTES
"Chief Complaint  Palpitations and Follow-up (Estradiol discussion )    Subjective        Eve Khan presents to [facility]  History of Present Illness  Patient follows up for ongoing management of chronic problems of hypertension she has had episodes of palpitations recently no effect of atenolol and she even adjusted her doses heart rate did come down baseline and's minimal effect on blood pressure she is worried because of fluctuating blood pressure readings 120s to 140/70-80.  She feels that a lot of this was triggered by recent episodes of stress anxiety and deadlines and the holidays.  Her sleep is fair she realizes she is an anxious person has never taking specific medication effect Ambien is taking very intermittently  She also brings to my attention that estradiol is no longer being covered for her atrophic vaginitis and she like to know what she should do about that  Objective   Vital Signs:  /74   Pulse 65   Temp 97.5 °F (36.4 °C)   Ht 160 cm (62.99\")   Wt 58.1 kg (128 lb 1.6 oz)   SpO2 100%   BMI 22.70 kg/m²   Estimated body mass index is 22.7 kg/m² as calculated from the following:    Height as of this encounter: 160 cm (62.99\").    Weight as of this encounter: 58.1 kg (128 lb 1.6 oz).       BMI is within normal parameters. No other follow-up for BMI required.      Physical Exam  Vitals and nursing note reviewed.   Constitutional:       Appearance: Normal appearance.   HENT:      Head: Normocephalic and atraumatic.   Cardiovascular:      Rate and Rhythm: Normal rate and regular rhythm.      Pulses: Normal pulses.      Heart sounds: Normal heart sounds.   Pulmonary:      Effort: Pulmonary effort is normal.      Breath sounds: Normal breath sounds.   Neurological:      Mental Status: She is alert.   Psychiatric:         Mood and Affect: Mood normal.         Behavior: Behavior normal.         Thought Content: Thought content normal.         Judgment: " Judgment normal.        Result Review :    Common labs          12/29/2023    13:23   Common Labs   WBC 4.43       Hemoglobin 13.0       Hematocrit 40.3       Platelets 187          Details          This result is from an external source.           Same date magnesium troponin proBNP normal ranges  Data reviewed : Cardiology studies EKG December 2023 normal sinus rhythm normal axis and intervals normal EKG             Assessment and Plan   Diagnoses and all orders for this visit:    1. Palpitations (Primary)  -     Holter Monitor - 72 Hour Up To 15 Days; Future    2. Primary hypertension    3. Anxiety    4. Atrophic vaginitis    Other orders  -     sertraline (Zoloft) 25 MG tablet; Take 1 tablet by mouth Daily. 7 pm  Dispense: 30 tablet; Refill: 1    Patient will call insurance company to decide which estrogen vaginal cream will be covered  Initiate sertraline for anxiety  Monitor blood pressure monitor palpitations follow-up results of Holter         Follow Up   Return in about 1 month (around 2/8/2024), or if symptoms worsen or fail to improve, for Recheck.  Patient was given instructions and counseling regarding her condition or for health maintenance advice. Please see specific information pulled into the AVS if appropriate.         Answers submitted by the patient for this visit:  Primary Reason for Visit (Submitted on 1/1/2024)  What is the primary reason for your visit?: High Blood Pressure

## 2024-01-24 ENCOUNTER — HOSPITAL ENCOUNTER (OUTPATIENT)
Dept: CARDIOLOGY | Facility: HOSPITAL | Age: 74
Discharge: HOME | End: 2024-01-24
Admitting: FAMILY MEDICINE
Payer: MEDICARE

## 2024-01-24 DIAGNOSIS — R00.2 PALPITATIONS: ICD-10-CM

## 2024-01-24 PROCEDURE — 93246 EXT ECG>7D<15D RECORDING: CPT

## 2024-02-02 ENCOUNTER — HOSPITAL ENCOUNTER (OUTPATIENT)
Dept: ULTRASOUND IMAGING | Facility: HOSPITAL | Age: 74
Discharge: HOME | End: 2024-02-02
Payer: MEDICARE

## 2024-02-02 DIAGNOSIS — R92.8 ABNORMAL MAMMOGRAM: ICD-10-CM

## 2024-02-02 PROCEDURE — 76641 ULTRASOUND BREAST COMPLETE: CPT

## 2024-02-12 ENCOUNTER — TELEPHONE (OUTPATIENT)
Dept: INTERNAL MEDICINE | Facility: CLINIC | Age: 74
End: 2024-02-12
Payer: MEDICARE

## 2024-08-20 ENCOUNTER — TRANSCRIBE ORDERS (OUTPATIENT)
Dept: ADMINISTRATIVE | Facility: HOSPITAL | Age: 74
End: 2024-08-20
Payer: MEDICARE

## 2024-08-20 DIAGNOSIS — Z12.31 SCREENING MAMMOGRAM, ENCOUNTER FOR: Primary | ICD-10-CM

## 2024-09-11 ENCOUNTER — OFFICE VISIT (OUTPATIENT)
Dept: INTERNAL MEDICINE | Facility: CLINIC | Age: 74
End: 2024-09-11
Payer: MEDICARE

## 2024-09-11 VITALS
DIASTOLIC BLOOD PRESSURE: 72 MMHG | HEART RATE: 78 BPM | HEIGHT: 63 IN | BODY MASS INDEX: 23.57 KG/M2 | SYSTOLIC BLOOD PRESSURE: 128 MMHG | OXYGEN SATURATION: 99 % | WEIGHT: 133 LBS | TEMPERATURE: 96.8 F | RESPIRATION RATE: 16 BRPM

## 2024-09-11 DIAGNOSIS — F41.9 ANXIETY: ICD-10-CM

## 2024-09-11 DIAGNOSIS — Z00.00 MEDICARE ANNUAL WELLNESS VISIT, SUBSEQUENT: Primary | ICD-10-CM

## 2024-09-11 DIAGNOSIS — N95.2 ATROPHIC VAGINITIS: ICD-10-CM

## 2024-09-11 DIAGNOSIS — Z00.00 HEALTH CARE MAINTENANCE: ICD-10-CM

## 2024-09-11 DIAGNOSIS — F51.01 PRIMARY INSOMNIA: ICD-10-CM

## 2024-09-11 DIAGNOSIS — B35.1 FUNGAL INFECTION OF TOENAIL: ICD-10-CM

## 2024-09-11 DIAGNOSIS — I10 PRIMARY HYPERTENSION: ICD-10-CM

## 2024-09-11 PROCEDURE — 99214 OFFICE O/P EST MOD 30 MIN: CPT | Performed by: FAMILY MEDICINE

## 2024-09-11 PROCEDURE — 1159F MED LIST DOCD IN RCRD: CPT | Performed by: FAMILY MEDICINE

## 2024-09-11 PROCEDURE — 3074F SYST BP LT 130 MM HG: CPT | Performed by: FAMILY MEDICINE

## 2024-09-11 PROCEDURE — 1160F RVW MEDS BY RX/DR IN RCRD: CPT | Performed by: FAMILY MEDICINE

## 2024-09-11 PROCEDURE — G0439 PPPS, SUBSEQ VISIT: HCPCS | Performed by: FAMILY MEDICINE

## 2024-09-11 PROCEDURE — 3078F DIAST BP <80 MM HG: CPT | Performed by: FAMILY MEDICINE

## 2024-09-11 PROCEDURE — 99397 PER PM REEVAL EST PAT 65+ YR: CPT | Performed by: FAMILY MEDICINE

## 2024-09-11 PROCEDURE — 1126F AMNT PAIN NOTED NONE PRSNT: CPT | Performed by: FAMILY MEDICINE

## 2024-09-11 RX ORDER — ESTRADIOL 0.1 MG/G
2 CREAM VAGINAL EVERY OTHER DAY
Qty: 3 EACH | Refills: 4 | Status: SHIPPED | OUTPATIENT
Start: 2024-09-11

## 2024-09-11 RX ORDER — FLUCONAZOLE 200 MG/1
200 TABLET ORAL DAILY
Qty: 90 TABLET | Refills: 1 | Status: CANCELLED | OUTPATIENT
Start: 2024-09-11

## 2024-09-11 RX ORDER — TERBINAFINE HYDROCHLORIDE 250 MG/1
250 TABLET ORAL DAILY
Qty: 84 TABLET | Refills: 0 | Status: SHIPPED | OUTPATIENT
Start: 2024-09-11

## 2024-09-11 RX ORDER — ESTRADIOL 0.1 MG/G
2 CREAM VAGINAL EVERY OTHER DAY
Qty: 3 EACH | Refills: 4 | Status: SHIPPED | OUTPATIENT
Start: 2024-09-11 | End: 2024-09-11 | Stop reason: SDUPTHER

## 2024-09-11 RX ORDER — ZOLPIDEM TARTRATE 5 MG/1
5 TABLET ORAL NIGHTLY PRN
Qty: 90 TABLET | Refills: 1 | Status: SHIPPED | OUTPATIENT
Start: 2024-09-11

## 2024-09-11 NOTE — PROGRESS NOTES
Subjective   Eve Khan is a 74 y.o. female.     Chief Complaint   Patient presents with    Hypertension    Anxiety         Hypertension  Associated symptoms include anxiety. Pertinent negatives include no chest pain, palpitations or shortness of breath.   Anxiety   Symptoms include nervous/anxious behavior.  Patient reports no chest pain, palpitations or shortness of breath.      Eve Khan 74 y.o. female who presents for an Annual Wellness Visit.  she has a history of   Patient Active Problem List   Diagnosis    Acne    Arthritis    Primary insomnia    Osteopenia of both hips    Fungal infection of toenail    Arthropathy of temporomandibular joint    Adenomatous polyp of colon    Abdominal bruit    Central serous chorioretinopathy of right eye    Medicare annual wellness visit, subsequent    Benign paroxysmal positional vertigo due to bilateral vestibular disorder    Menopausal vaginal dryness    Menopausal state    Health care maintenance    Colon polyps    Other fatigue    Primary hypertension    Atrophic vaginitis    Impaired hearing    Anxiety   .  she has been feeling fairly well.   I  reviewed health maintenance with her as part of my preventative care plan.  The following portions of the patient's history were reviewed and updated as appropriate: allergies, current medications, past family history, past medical history, past social history, past surgical history, and problem list.    Review of Systems   Constitutional:  Negative for appetite change, fever and unexpected weight change.   HENT:  Negative for ear pain, facial swelling and sore throat.    Eyes:  Negative for pain and visual disturbance.   Respiratory:  Negative for chest tightness, shortness of breath and wheezing.    Cardiovascular:  Negative for chest pain and palpitations.   Gastrointestinal:  Negative for abdominal pain and blood in stool.   Endocrine: Negative.    Genitourinary:  Negative for difficulty urinating and  hematuria.   Musculoskeletal:  Negative for joint swelling.   Neurological:  Negative for tremors, seizures and syncope.   Hematological:  Negative for adenopathy.   Psychiatric/Behavioral:  Positive for sleep disturbance. The patient is nervous/anxious.        Objective   Physical Exam  Vitals and nursing note reviewed.   Constitutional:       Appearance: Normal appearance. She is well-developed. She is not diaphoretic.   HENT:      Head: Normocephalic and atraumatic.      Right Ear: Tympanic membrane, ear canal and external ear normal.      Left Ear: Tympanic membrane, ear canal and external ear normal.   Eyes:      General: Lids are normal. No scleral icterus.     Extraocular Movements: Extraocular movements intact.      Conjunctiva/sclera: Conjunctivae normal.   Neck:      Thyroid: No thyroid mass or thyromegaly.      Vascular: No carotid bruit or JVD.   Cardiovascular:      Rate and Rhythm: Normal rate and regular rhythm.      Pulses: Normal pulses.           Radial pulses are 2+ on the right side and 2+ on the left side.      Heart sounds: Normal heart sounds. No murmur heard.  Pulmonary:      Effort: Pulmonary effort is normal. No respiratory distress.      Breath sounds: Normal breath sounds.   Abdominal:      Palpations: Abdomen is soft.      Tenderness: There is no right CVA tenderness or left CVA tenderness.   Musculoskeletal:      Cervical back: Normal range of motion.      Right lower leg: No edema.      Left lower leg: No edema.   Skin:     General: Skin is warm and dry.      Coloration: Skin is not pale.      Findings: No erythema or rash.   Neurological:      General: No focal deficit present.      Mental Status: She is alert and oriented to person, place, and time.      Sensory: No sensory deficit.      Deep Tendon Reflexes: Reflexes are normal and symmetric.   Psychiatric:         Mood and Affect: Mood normal.         Behavior: Behavior normal. Behavior is cooperative.         Thought Content:  Thought content normal.         Judgment: Judgment normal.         Assessment & Plan   Diagnoses and all orders for this visit:    1. Medicare annual wellness visit, subsequent (Primary)    2. Health care maintenance    3. Primary insomnia  -     zolpidem (Ambien) 5 MG tablet; Take 1 tablet by mouth At Night As Needed for Sleep.  Dispense: 90 tablet; Refill: 1    4. Primary hypertension  Assessment & Plan:  Hypertension is borderline  Medication changes per orders.  Blood pressure will be reassessedin 4 weeks.    Orders:  -     Lipid Panel  -     Comprehensive Metabolic Panel  -     TSH  -     Magnesium    5. Anxiety    6. Fungal infection of toenail  -     terbinafine (lamiSIL) 250 MG tablet; Take 1 tablet by mouth Daily.  Dispense: 84 tablet; Refill: 0  -     AST; Future  -     ALT; Future    7. Atrophic vaginitis  -     estradiol (ESTRACE) 0.1 MG/GM vaginal cream; Insert 2 g into the vagina Every Other Day. INSERT 2 gm APPLICATORFUL VAGINALLY every other day  Dispense: 3 each; Refill: 4      Continue healthy lifestyle calorie appropriate diet and regular physical activity  Education provided regarding prevention of serious illness with immunizations recommend flu vaccine and COVID-vaccine  Education provided regarding early detection screening patient is due for screening colonoscopy in 2026  Follow-up ongoing management of chronic problems otherwise preventively annually

## 2024-09-11 NOTE — ASSESSMENT & PLAN NOTE
Hypertension is borderline  Medication changes per orders.  Blood pressure will be reassessedin 4 weeks.

## 2024-09-11 NOTE — PROGRESS NOTES
"Chief Complaint  Hypertension and Anxiety    Subjective        Eve Khan presents to [facility]  History of Present Illness  Patient follows up for ongoing management of hypertension and anxiety toenail fungus atrophic vaginitis which she uses estrogen cream like sent to different pharmacy  She has had multiple attempts of trying to correct her toenail fungus and she would like to try oral medication she knows that she should have her liver enzymes monitored after 6 weeks of therapy  She has no abdominal pain or history of elevated liver enzymes  Blood pressure is intermittently controlled she does believe that there might be some component of anxiety related to elevated blood pressure and she is hesitant to take the sertraline which was previously ordered  She does take magnesium because she feels this might help her bone absorption  Objective   Vital Signs:  /72   Pulse 78   Temp 96.8 °F (36 °C) (Temporal)   Resp 16   Ht 160 cm (62.99\")   Wt 60.3 kg (133 lb)   SpO2 99%   BMI 23.57 kg/m²   Estimated body mass index is 23.57 kg/m² as calculated from the following:    Height as of this encounter: 160 cm (62.99\").    Weight as of this encounter: 60.3 kg (133 lb).    BMI is within normal parameters. No other follow-up for BMI required.      Physical Exam  Constitutional:       Appearance: Normal appearance.   HENT:      Head: Normocephalic and atraumatic.   Cardiovascular:      Rate and Rhythm: Normal rate and regular rhythm.      Pulses: Normal pulses.      Heart sounds: Normal heart sounds.   Pulmonary:      Effort: Pulmonary effort is normal.      Breath sounds: Normal breath sounds.   Musculoskeletal:      Right lower leg: No edema.      Left lower leg: No edema.   Skin:     General: Skin is warm and dry.   Neurological:      Mental Status: She is alert.   Psychiatric:         Mood and Affect: Mood normal.         Behavior: Behavior normal.         Thought " Content: Thought content normal.         Judgment: Judgment normal.        Result Review :    Common labs          12/29/2023    13:23   Common Labs   WBC 4.43       Hemoglobin 13.0       Hematocrit 40.3       Platelets 187          Details          This result is from an external source.                       Assessment and Plan   Diagnoses and all orders for this visit:      1. Primary insomnia  -     zolpidem (Ambien) 5 MG tablet; Take 1 tablet by mouth At Night As Needed for Sleep.  Dispense: 90 tablet; Refill: 1    2. Primary hypertension  Assessment & Plan:  Hypertension is borderline  Medication changes per orders.  Blood pressure will be reassessedin 4 weeks.    Orders:  -     Lipid Panel  -     Comprehensive Metabolic Panel  -     TSH  -     Magnesium    3. Anxiety    4. Fungal infection of toenail  -     terbinafine (lamiSIL) 250 MG tablet; Take 1 tablet by mouth Daily.  Dispense: 84 tablet; Refill: 0  -     AST; Future  -     ALT; Future    5. Atrophic vaginitis  -     estradiol (ESTRACE) 0.1 MG/GM vaginal cream; Insert 2 g into the vagina Every Other Day. INSERT 2 gm APPLICATORFUL VAGINALLY every other day  Dispense: 3 each; Refill: 4    Anxiety recommend trial of sertraline 1 pill daily for 1 to 2 weeks double the dose if no unwanted side effects patient will call in 3 weeks dosing of sertraline any benefit monitor blood pressure goals less than 140/90  Results of blood work and again in approximately 6 weeks AST ALT testing after initiating Lamisil for toenail fungus           Follow Up   Return in about 6 weeks (around 10/23/2024), or if symptoms worsen or fail to improve, for Recheck.  Patient was given instructions and counseling regarding her condition or for health maintenance advice. Please see specific information pulled into the AVS if appropriate.

## 2024-09-11 NOTE — PROGRESS NOTES
Subjective   The ABCs of the Annual Wellness Visit  Medicare Wellness Visit      Eve Khan is a 74 y.o. patient who presents for a Medicare Wellness Visit.    The following portions of the patient's history were reviewed and   updated as appropriate: allergies, current medications, past family history, past medical history, past social history, past surgical history, and problem list.    Compared to one year ago, the patient's physical   health is worse.  Compared to one year ago, the patient's mental   health is worse.    Recent Hospitalizations:  She was not admitted to the hospital during the last year.     Current Medical Providers:  Patient Care Team:  Yifan Douglas MD as PCP - General    Outpatient Medications Prior to Visit   Medication Sig Dispense Refill    ALLERGY SERUM INJECTION Inject  under the skin into the appropriate area as directed 1 (One) Time. Once monthly      atenolol (TENORMIN) 25 MG tablet TAKE ONE-HALF (1/2) TABLET DAILY (NEEDS TO BE SEEN BEFORE THE NEXT REFILL) 45 tablet 3    ibuprofen (ADVIL,MOTRIN) 200 MG tablet Take 1 tablet by mouth Daily As Needed for Mild Pain.      Loratadine 10 MG capsule Take 1 capsule by mouth Daily As Needed.      Magnesium 250 MG tablet Take 1 tablet by mouth Daily.      Triamcinolone Acetonide (NASACORT) 55 MCG/ACT nasal inhaler 2 sprays into the nostril(s) as directed by provider Daily.      doxycycline (VIBRAMYCIN) 100 MG capsule Take 1 capsule by mouth 2 (Two) Times a Day. 20 capsule 0    estradiol (ESTRACE) 0.1 MG/GM vaginal cream Insert 2 g into the vagina Every Other Day. INSERT 2 gm APPLICATORFUL VAGINALLY every other day 3 each 4    zolpidem (Ambien) 5 MG tablet Take 1 tablet by mouth At Night As Needed for Sleep. 90 tablet 1    sertraline (Zoloft) 25 MG tablet Take 1 tablet by mouth Daily. 7 pm 30 tablet 1     No facility-administered medications prior to visit.     No opioid medication identified on active medication list. I have reviewed  "chart for other potential  high risk medication/s and harmful drug interactions in the elderly.      Aspirin is not on active medication list.  Aspirin use is not indicated based on review of current medical condition/s. Risk of harm outweighs potential benefits.  .    Patient Active Problem List   Diagnosis    Acne    Arthritis    Primary insomnia    Osteopenia of both hips    Fungal infection of toenail    Arthropathy of temporomandibular joint    Adenomatous polyp of colon    Abdominal bruit    Central serous chorioretinopathy of right eye    Medicare annual wellness visit, subsequent    Benign paroxysmal positional vertigo due to bilateral vestibular disorder    Menopausal vaginal dryness    Menopausal state    Health care maintenance    Colon polyps    Other fatigue    Primary hypertension    Atrophic vaginitis    Impaired hearing    Anxiety     Advance Care Planning Advance Directive is not on file.  ACP discussion was held with the patient during this visit. Patient has an advance directive (not in EMR), copy requested.            Objective   Vitals:    09/11/24 0922   BP: 128/72   Pulse: 78   Resp: 16   Temp: 96.8 °F (36 °C)   TempSrc: Temporal   SpO2: 99%   Weight: 60.3 kg (133 lb)   Height: 160 cm (62.99\")       Estimated body mass index is 23.57 kg/m² as calculated from the following:    Height as of this encounter: 160 cm (62.99\").    Weight as of this encounter: 60.3 kg (133 lb).    BMI is within normal parameters. No other follow-up for BMI required.       Does the patient have evidence of cognitive impairment? No                                                                                               Health  Risk Assessment    Smoking Status:  Social History     Tobacco Use   Smoking Status Never   Smokeless Tobacco Never     Alcohol Consumption:  Social History     Substance and Sexual Activity   Alcohol Use Yes    Alcohol/week: 2.0 standard drinks of alcohol    Types: 2 Glasses of wine per week "    Comment: One glass of wine once or twice a week.       Fall Risk Screen  JENNIEADI Fall Risk Assessment was completed, and patient is at LOW risk for falls.Assessment completed on:2024    Depression Screenin/11/2024     9:26 AM   PHQ-2/PHQ-9 Depression Screening   Little Interest or Pleasure in Doing Things 0-->not at all   Feeling Down, Depressed or Hopeless 0-->not at all   PHQ-9: Brief Depression Severity Measure Score 0     Health Habits and Functional and Cognitive Screenin/4/2024    12:17 PM   Functional & Cognitive Status   Do you have difficulty preparing food and eating? No   Do you have difficulty bathing yourself, getting dressed or grooming yourself? No   Do you have difficulty using the toilet? No   Do you have difficulty moving around from place to place? No   Do you have trouble with steps or getting out of a bed or a chair? No   Current Diet Well Balanced Diet   Dental Exam Up to date   Eye Exam Up to date   Exercise (times per week) 5 times per week   Current Exercises Include Gardening;House Cleaning;Walking;Yard Work   Do you need help using the phone?  No   Are you deaf or do you have serious difficulty hearing?  No   Do you need help to go to places out of walking distance? No   Do you need help shopping? No   Do you need help preparing meals?  No   Do you need help with housework?  No   Do you need help with laundry? No   Do you need help taking your medications? No   Do you need help managing money? No   Do you ever drive or ride in a car without wearing a seat belt? No   Have you felt unusual stress, anger or loneliness in the last month? No   Who do you live with? Spouse   If you need help, do you have trouble finding someone available to you? No   Have you been bothered in the last four weeks by sexual problems? No   Do you have difficulty concentrating, remembering or making decisions? No           Age-appropriate Screening Schedule:  Refer to the list below for  future screening recommendations based on patient's age, sex and/or medical conditions. Orders for these recommended tests are listed in the plan section. The patient has been provided with a written plan.    Health Maintenance List  Health Maintenance   Topic Date Due    COVID-19 Vaccine (7 - 2023-24 season) 09/01/2024    ANNUAL WELLNESS VISIT  09/06/2024    INFLUENZA VACCINE  08/01/2024    TDAP/TD VACCINES (2 - Td or Tdap) 06/15/2025    MAMMOGRAM  10/02/2025    DXA SCAN  12/27/2025    COLORECTAL CANCER SCREENING  11/02/2026    HEPATITIS C SCREENING  Completed    RSV Vaccine - Adults  Completed    Pneumococcal Vaccine 65+  Completed    ZOSTER VACCINE  Discontinued                                                                                                                                                CMS Preventative Services Quick Reference  Risk Factors Identified During Encounter  Immunizations Discussed/Encouraged: Influenza and COVID19    The above risks/problems have been discussed with the patient.  Pertinent information has been shared with the patient in the After Visit Summary.  An After Visit Summary and PPPS were made available to the patient.    Follow Up:   Next Medicare Wellness visit to be scheduled in 1 year.     Assessment & Plan  Medicare annual wellness visit, subsequent    Health care maintenance    Primary insomnia    Primary hypertension  Hypertension is borderline  Medication changes per orders.  Blood pressure will be reassessedin 4 weeks.  Anxiety    Fungal infection of toenail    Atrophic vaginitis      Orders Placed This Encounter   Procedures    Lipid Panel    Comprehensive Metabolic Panel    TSH    Magnesium    AST    ALT     New Medications Ordered This Visit   Medications    zolpidem (Ambien) 5 MG tablet     Sig: Take 1 tablet by mouth At Night As Needed for Sleep.     Dispense:  90 tablet     Refill:  1    estradiol (ESTRACE) 0.1 MG/GM vaginal cream     Sig: Insert 2 g into the  vagina Every Other Day. INSERT 2 gm APPLICATORFUL VAGINALLY every other day     Dispense:  3 each     Refill:  4    terbinafine (lamiSIL) 250 MG tablet     Sig: Take 1 tablet by mouth Daily.     Dispense:  84 tablet     Refill:  0          Follow Up:   Return in about 1 year (around 9/11/2025), or if symptoms worsen or fail to improve, for Recheck.

## 2024-09-12 LAB
ALBUMIN SERPL-MCNC: 3.7 G/DL (ref 3.5–5.2)
ALBUMIN/GLOB SERPL: 1.4 G/DL
ALP SERPL-CCNC: 41 U/L (ref 39–117)
ALT SERPL-CCNC: 24 U/L (ref 1–33)
AST SERPL-CCNC: 25 U/L (ref 1–32)
BILIRUB SERPL-MCNC: 0.3 MG/DL (ref 0–1.2)
BUN SERPL-MCNC: 18 MG/DL (ref 8–23)
BUN/CREAT SERPL: 18.6 (ref 7–25)
CALCIUM SERPL-MCNC: 9.9 MG/DL (ref 8.6–10.5)
CHLORIDE SERPL-SCNC: 105 MMOL/L (ref 98–107)
CHOLEST SERPL-MCNC: 207 MG/DL (ref 0–200)
CO2 SERPL-SCNC: 29.2 MMOL/L (ref 22–29)
CREAT SERPL-MCNC: 0.97 MG/DL (ref 0.57–1)
EGFRCR SERPLBLD CKD-EPI 2021: 61.1 ML/MIN/1.73
GLOBULIN SER CALC-MCNC: 2.6 GM/DL
GLUCOSE SERPL-MCNC: 77 MG/DL (ref 65–99)
HDLC SERPL-MCNC: 86 MG/DL (ref 40–60)
LDLC SERPL CALC-MCNC: 102 MG/DL (ref 0–100)
MAGNESIUM SERPL-MCNC: 2.2 MG/DL (ref 1.6–2.4)
POTASSIUM SERPL-SCNC: 4.4 MMOL/L (ref 3.5–5.2)
PROT SERPL-MCNC: 6.3 G/DL (ref 6–8.5)
SODIUM SERPL-SCNC: 141 MMOL/L (ref 136–145)
TRIGL SERPL-MCNC: 112 MG/DL (ref 0–150)
TSH SERPL DL<=0.005 MIU/L-ACNC: 1.34 UIU/ML (ref 0.27–4.2)
VLDLC SERPL CALC-MCNC: 19 MG/DL (ref 5–40)

## 2024-09-25 ENCOUNTER — TELEPHONE (OUTPATIENT)
Dept: INTERNAL MEDICINE | Facility: CLINIC | Age: 74
End: 2024-09-25

## 2024-09-25 NOTE — TELEPHONE ENCOUNTER
Caller: Eve Khan    Relationship: Self    Best call back number: [phone]     Which medication are you concerned about: terbinafine (lamiSIL) 250 MG tablet ,sertraline (Zoloft) 25 MG tablet     Who prescribed you this medication: LANG     When did you start taking this medication: TWO WEEKS     What are your concerns: PATIENT STATED THAT SHE IS UNCLEAR ON HOW THESE MEDICATION ARE TO BE TAKING. PLEASE CALL PATIENT TO DISCUSS FURTHER.     How long have you had these concerns:

## 2024-10-01 ENCOUNTER — TELEPHONE (OUTPATIENT)
Dept: INTERNAL MEDICINE | Facility: CLINIC | Age: 74
End: 2024-10-01

## 2024-10-01 NOTE — TELEPHONE ENCOUNTER
Caller: Eve Khan    Relationship: Self    Best call back number: [phone]    What medication are you requesting: sertraline (Zoloft)  50 MG tablet       If a prescription is needed, what is your preferred pharmacy and phone number: [facility] - [address] [phone] [facility] [phone]      Additional notes:PATIENT REQUESTS INCREASING DOSAGE TO 50 MG TABLETS

## 2024-10-02 DIAGNOSIS — I10 PRIMARY HYPERTENSION: ICD-10-CM

## 2024-10-02 RX ORDER — ATENOLOL 25 MG/1
TABLET ORAL
Qty: 45 TABLET | Refills: 3 | Status: SHIPPED | OUTPATIENT
Start: 2024-10-02

## 2024-10-03 ENCOUNTER — HOSPITAL ENCOUNTER (OUTPATIENT)
Dept: MAMMOGRAPHY | Facility: HOSPITAL | Age: 74
Discharge: HOME | End: 2024-10-03
Admitting: FAMILY MEDICINE
Payer: MEDICARE

## 2024-10-03 DIAGNOSIS — Z12.31 SCREENING MAMMOGRAM, ENCOUNTER FOR: ICD-10-CM

## 2024-10-03 PROCEDURE — 77063 BREAST TOMOSYNTHESIS BI: CPT

## 2024-10-03 PROCEDURE — 77067 SCR MAMMO BI INCL CAD: CPT

## 2024-10-31 DIAGNOSIS — B35.1 FUNGAL INFECTION OF TOENAIL: ICD-10-CM

## 2024-10-31 RX ORDER — TERBINAFINE HYDROCHLORIDE 250 MG/1
250 TABLET ORAL DAILY
Qty: 90 TABLET | Refills: 0 | Status: SHIPPED | OUTPATIENT
Start: 2024-10-31

## 2024-11-22 DIAGNOSIS — B35.1 FUNGAL INFECTION OF TOENAIL: ICD-10-CM

## 2024-11-25 RX ORDER — TERBINAFINE HYDROCHLORIDE 250 MG/1
250 TABLET ORAL DAILY
Qty: 84 TABLET | Refills: 3 | Status: SHIPPED | OUTPATIENT
Start: 2024-11-25

## 2024-12-09 DIAGNOSIS — B35.1 FUNGAL INFECTION OF TOENAIL: ICD-10-CM

## 2024-12-10 LAB
ALT SERPL-CCNC: 62 IU/L (ref 0–32)
AST SERPL-CCNC: 44 IU/L (ref 0–40)

## 2024-12-13 DIAGNOSIS — B35.1 FUNGAL INFECTION OF TOENAIL: ICD-10-CM

## 2024-12-13 RX ORDER — TERBINAFINE HYDROCHLORIDE 250 MG/1
250 TABLET ORAL DAILY
Qty: 90 TABLET | Refills: 3 | Status: SHIPPED | OUTPATIENT
Start: 2024-12-13

## 2024-12-17 RX ORDER — CICLOPIROX 80 MG/ML
SOLUTION TOPICAL NIGHTLY
Qty: 6 ML | Refills: 2 | Status: SHIPPED | OUTPATIENT
Start: 2024-12-17

## 2025-08-05 ENCOUNTER — TRANSCRIBE ORDERS (OUTPATIENT)
Dept: ADMINISTRATIVE | Facility: HOSPITAL | Age: 75
End: 2025-08-05
Payer: MEDICARE

## 2025-08-05 DIAGNOSIS — Z12.31 SCREENING MAMMOGRAM FOR BREAST CANCER: Primary | ICD-10-CM

## (undated) DEVICE — SENSR O2 OXIMAX FNGR A/ 18IN NONSTR

## (undated) DEVICE — CANN O2 ETCO2 FITS ALL CONN CO2 SMPL A/ 7IN DISP LF

## (undated) DEVICE — LN SMPL CO2 SHTRM SD STREAM W/M LUER

## (undated) DEVICE — TUBING, SUCTION, 1/4" X 10', STRAIGHT: Brand: MEDLINE

## (undated) DEVICE — ADAPT CLN BIOGUARD AIR/H2O DISP

## (undated) DEVICE — KT ORCA ORCAPOD DISP STRL

## (undated) DEVICE — SINGLE-USE BIOPSY FORCEPS: Brand: RADIAL JAW 4